# Patient Record
Sex: FEMALE | Race: WHITE | NOT HISPANIC OR LATINO | Employment: FULL TIME | URBAN - METROPOLITAN AREA
[De-identification: names, ages, dates, MRNs, and addresses within clinical notes are randomized per-mention and may not be internally consistent; named-entity substitution may affect disease eponyms.]

---

## 2017-04-17 ENCOUNTER — OFFICE VISIT (OUTPATIENT)
Dept: ORTHOPEDICS | Facility: CLINIC | Age: 67
End: 2017-04-17

## 2017-04-17 VITALS — BODY MASS INDEX: 26.37 KG/M2 | HEIGHT: 62 IN | WEIGHT: 143.3 LBS

## 2017-04-17 DIAGNOSIS — Z96.659 PAINFUL TOTAL KNEE REPLACEMENT (H): Primary | ICD-10-CM

## 2017-04-17 DIAGNOSIS — T84.84XA PAINFUL TOTAL KNEE REPLACEMENT (H): Primary | ICD-10-CM

## 2017-04-17 DIAGNOSIS — Z96.659 PAINFUL TOTAL KNEE REPLACEMENT, INITIAL ENCOUNTER (H): Primary | ICD-10-CM

## 2017-04-17 DIAGNOSIS — T84.84XA PAINFUL TOTAL KNEE REPLACEMENT, INITIAL ENCOUNTER (H): Primary | ICD-10-CM

## 2017-04-17 PROBLEM — G43.909 MIGRAINE WITHOUT STATUS MIGRAINOSUS, NOT INTRACTABLE: Status: ACTIVE | Noted: 2017-04-17

## 2017-04-17 RX ORDER — SPIRONOLACTONE 50 MG/1
50 TABLET, FILM COATED ORAL
COMMUNITY
Start: 2017-03-09

## 2017-04-17 RX ORDER — MELOXICAM 15 MG/1
15 TABLET ORAL
Status: ON HOLD | COMMUNITY
Start: 2017-04-12 | End: 2021-08-18

## 2017-04-17 RX ORDER — SILVER SULFADIAZINE 10 MG/G
CREAM TOPICAL
COMMUNITY
Start: 2017-03-09 | End: 2024-01-16

## 2017-04-17 NOTE — NURSING NOTE
"Reason For Visit:   Chief Complaint   Patient presents with     RECHECK     F/u Painful Left TKA (Nov. 2015, Dr. Garcia) with Same day XR. Referring:  DEYANIRA GARCIA                  Ht 1.581 m (5' 2.25\")  Wt 65 kg (143 lb 4.8 oz)  BMI 26 kg/m2                      Current Outpatient Prescriptions   Medication     spironolactone (ALDACTONE) 50 MG tablet     silver sulfADIAZINE (SILVADENE) 1 % cream     omeprazole (PRILOSEC) 20 MG CR capsule     meloxicam (MOBIC) 15 MG tablet     Dichloracetic Acid, [id number], (BICHLORACETIC ACID KAHLENBERG EX)     RESVERATROL PO     Biotin w/ Vitamins C & E (HAIR/SKIN/NAILS PO)     calcium carbonate (RA CALCIUM) 500 MG tablet     cyclobenzaprine (FLEXERIL) 10 MG tablet     Glucosamine-Chondroitin-MSM (RA GLUCOSAMINE-CHONDROIT-MSM) 500-400-300 MG TABS     Multiple Vitamin (MULTI-VITAMINS) TABS     DHA-EPA-VITAMIN E PO     raloxifene (EVISTA) 60 MG tablet     traMADol (ULTRAM) 50 MG tablet     traZODone (DESYREL) 50 MG tablet     triamterene-hydrochlorothiazide (DYAZIDE) 37.5-25 MG per capsule     butalbital-acetaminophen-caffeine (FIORICET, ESGIC) -40 MG per tablet     albuterol (ALBUTEROL) 108 (90 BASE) MCG/ACT inhaler     cetirizine (ZYRTEC) 10 MG tablet     Cholecalciferol (D 1000) 1000 UNITS CAPS     clindamycin-benzoyl peroxide (BENZACLIN) gel     SUMAtriptan (IMITREX) 100 MG tablet     topiramate (TOPAMAX) 100 MG tablet     metroNIDAZOLE (METROGEL) 1 % gel     No current facility-administered medications for this visit.        Allergies   Allergen Reactions     Celecoxib Other (See Comments)     Citalopram Diarrhea     Naproxen Other (See Comments)     Nsaids Other (See Comments)     Gastric ulcer     Ragweeds      Runny nose     Seasonal Allergies Other (See Comments)     Venlafaxine Other (See Comments)     Foggy headed     Vicodin [Hydrocodone-Acetaminophen] Hives     Bupropion Anxiety       Hilary Zacarias C.M.A.             "

## 2017-04-17 NOTE — LETTER
4/17/2017       RE: Sharon Torrez  [address] [facility] [place] [facility] [address]     Dear Colleague,    Thank you for referring your patient, Sharon Torrez, to the [facility] at [facility]. Please see a copy of my visit note below.    [facility], Orthopaedic Surgery Consultation  by Harrison Tyler M.D.     Sharon Torrez MRN# [id number]   Age: 66 year old YOB: 1950      Requesting physician: Scott Garcia MD [facility]  Dr. Los Rios, PCP  Dragan Thurston MD [facility]           Sharon is still having pain that is debilitating.  She tries to manage her pain daily and regrets having done the L TKA as she feels her level of function is worse than before the TKA.  The severity is greater than her last visit w/ me.  She denies any sx's of instability.   No bouts of swelling, warmth or hx of any injury.    EXAM: no effusion, warmth, 1+ LCL laxity. 0-120 deg ROM. No quad atrophy.    XR: no significant XR change. ? Halo lucency about tibial component.      Assessment and Plan:   Assessment:  1. Painful L TKA, during early postop period, etiology unclear and now worse than last visit. Diff Dx: premature loosening, occult infection, component malposition with PF joint dysfunction. No sx's of instability or mechanical dysfunction.           Plan:  1. We will repeat the w/u, that was done previously for an infection, due to ongoing symptoms  2. Aspiration for cell count, cultures, alpha defensin.  3. ESR,CRP, IL6  4. Repeat Bone scan.  To be done at [facility] for consistency. Although premature loosening is a possibility, findings on current imaging are rather subtle (only suggestion of such on BS while XR unremarkable).   5. She wants to wait untl 5/1 to undergo the above investigations so they are included in her Medicare coverage.  6. If above w/u is (-), then advise serial observation with imaging to assess for any change over time.       Harrison REGAN  MD Merlene Tyler Family Professor  Oncology and Adult Reconstructive Surgery  Dept Orthopaedic Surgery, LT[facility], located within [facility]  [phone] office, [phone] pager  [email or web address]    Total Time = 15 min, 50% of which was spent in counseling and coordination of care as documented above.

## 2017-04-17 NOTE — PROGRESS NOTES
[facility], Orthopaedic Surgery Consultation  by Harrison Tyler M.D.     Sharon Torrez MRN# [id number]   Age: 66 year old YOB: 1950      Requesting physician: Scott Garcia MD [facility]  Dr. Los Rios, PCP  Dragan Thurston MD [facility]           Sharon is still having pain that is debilitating.  She tries to manage her pain daily and regrets having done the L TKA as she feels her level of function is worse than before the TKA.  The severity is greater than her last visit w/ me.  She denies any sx's of instability.   No bouts of swelling, warmth or hx of any injury.    EXAM: no effusion, warmth, 1+ LCL laxity. 0-120 deg ROM. No quad atrophy.    XR: no significant XR change. ? Halo lucency about tibial component.      Assessment and Plan:   Assessment:  1. Painful L TKA, during early postop period, etiology unclear and now worse than last visit. Diff Dx: premature loosening, occult infection, component malposition with PF joint dysfunction. No sx's of instability or mechanical dysfunction.           Plan:  1. We will repeat the w/u, that was done previously for an infection, due to ongoing symptoms  2. Aspiration for cell count, cultures, alpha defensin.  3. ESR,CRP, IL6  4. Repeat Bone scan.  To be done at [facility] for consistency. Although premature loosening is a possibility, findings on current imaging are rather subtle (only suggestion of such on BS while XR unremarkable).   5. She wants to wait untl 5/1 to undergo the above investigations so they are included in her Medicare coverage.  6. If above w/u is (-), then advise serial observation with imaging to assess for any change over time.       MD Merlene Barros Family Professor  Oncology and Adult Reconstructive Surgery  Dept Orthopaedic Surgery, [facility]  [phone] office, [phone] pager  [email or web address]    Total Time = 15 min, 50% of which was spent in counseling and coordination of care as documented  above.

## 2017-04-17 NOTE — MR AVS SNAPSHOT
After Visit Summary   4/17/2017    Sharon Torrez    MRN: [id number]           Patient Information     Date Of Birth          1950        Visit Information        Provider Department      4/17/2017 2:15 PM Harrison Tyler MD [facility]        Today's Diagnoses     Painful total knee replacement, initial encounter (H)    -  1       Follow-ups after your visit        Your next 10 appointments already scheduled     Apr 17, 2017  4:00 PM CDT   LAB with  LAB   [facility] ([facility])    [address] [address] [phone]           Patient must bring picture ID.  Patient should be prepared to give a urine specimen  Please do not eat 10-12 hours before your appointment if you are coming in fasting for labs on lipids, cholesterol, or glucose (sugar).  Pregnant women should follow their Care Team instructions. Water with medications is okay. Do not drink coffee or other fluids.   If you have concerns about taking  your medications, please ask at office or if scheduling via [employer], send a message by clicking on Secure Messaging, Message Your Care Team.            May 04, 2017  8:00 AM CDT   NM INJECT 3PH BONE SCAN with SHNM1   [facility] ([facility])    [address] [place] [id number] [phone]            May 04, 2017 11:00 AM CDT   NM BONE SCAN 3 PHASE with SHNM1   [facility] ([facility])    [address] [place] [id number] [phone]           Please bring a list of your medicines to the exam. (Include vitamins, minerals and over-the-counter drugs.) You should wear comfortable clothes. Leave your valuables at home. Please bring related prior results and films. Tell your doctor:   If you are breastfeeding or may be pregnant.   If you have had a barium test within the past few days. Barium may change the  results of certain exams.   If you think you may need sedation (medicine to help you relax).  You may eat and drink as normal.  Drink plenty of fluids and empty bladder frequently between injection and scans.            May 04, 2017  1:00 PM CDT   XR JOINT ASPRIATION MAJOR LT with SHXR4, SH MSK RAD   [facility] ([facility])    [address] [facility] [address] [address] [phone]           Stop drinking 1 hour before the exam.  You may take your medicines as usual, except for blood thinners (Coumadin, Plavix, Ticlid, Persantine, Aggrenox, Pletal, Effient, Brilliant). Talk to your doctor if you take these.  Tell your doctor if:   You have ever had an allergic reaction to X-ray dye (contrast fluid).   There is a chance you may be pregnant.  Please bring a list of your current medicines to your exam. Include vitamins, minerals and over-the-counter medicines.  Please call the Imaging Department at your exam site with any questions.              Future tests that were ordered for you today     Open Future Orders        Priority Expected Expires Ordered    CRP inflammation Routine  5/17/2017 4/17/2017    Erythrocyte sedimentation rate auto Routine  5/17/2017 4/17/2017    Interleukin 6 Blood Routine  5/17/2017 4/17/2017    NM Bone scan 3 phase Routine  4/17/2018 4/17/2017    XR Joint Aspiration Major Left Routine 4/17/2017 4/17/2018 4/17/2017            Who to contact     Please call your clinic at [phone] to:    Ask questions about your health    Make or cancel appointments    Discuss your medicines    Learn about your test results    Speak to your doctor   If you have compliments or concerns about an experience at your clinic, or if you wish to file a complaint, please contact [facility] Patient Relations at [phone] or email us at [email or web address]         Additional Information About Your Visit        MyChart Information      ""[employer] is an electronic gateway that provides easy, online access to your medical records. With "[employer] you can request a clinic appointment, read your test results, renew a prescription or communicate with your care team.     To sign up for "[employer] visit the website at [email or web address]   You will be asked to enter the access code listed below, as well as some personal information. Please follow the directions to create your username and password.     Your access code is: [email or web address]  Expires: 2017  3:39 PM     Your access code will  in 90 days. If you need help or a new code, please contact your [facility] or call [phone] for assistance.        Care EveryWhere ID     This is your Care EveryWhere ID. This could be used by other organizations to access your [facility] medical records  [id number]        Your Vitals Were     Height BMI (Body Mass Index)                1.581 m (5' 2.25\") 26 kg/m2           Blood Pressure from Last 3 Encounters:   13 131/72    Weight from Last 3 Encounters:   17 65 kg (143 lb 4.8 oz)   16 68.5 kg (151 lb)   13 69.4 kg (153 lb)               Primary Care Provider Office Phone # Fax #    Los Rios [phone]       [facility] [address] [place] [id number]        Thank you!     Thank you for choosing [facility] ORTHOPAEDIC [facility]  for your care. Our goal is always to provide you with excellent care. Hearing back from our patients is one way we can continue to improve our services. Please take a few minutes to complete the written survey that you may receive in the mail after your visit with us. Thank you!             Your Updated Medication List - Protect others around you: Learn how to safely use, store and throw away your medicines at www.disposemymeds.org.          This list is accurate as of: 17  3:39 PM.  Always use your most recent med list.                "    Brand Name Dispense Instructions for use    albuterol 108 (90 BASE) MCG/ACT Inhaler   Generic drug:  albuterol      Inhale 2 puffs into the lungs       BICHLORACETIC ACID ALY EX      bichlor daily       butalbital-acetaminophen-caffeine -40 MG per tablet    FIORICET/ESGIC         cetirizine 10 MG tablet    zyrTEC     Take 10 mg by mouth       clindamycin-benzoyl peroxide gel    BENZACLIN     Apply to skin daily hs       cyclobenzaprine 10 MG tablet    FLEXERIL         D 1000 1000 UNITS Caps      Take 3 capsules by mouth       DHA-EPA-VITAMIN E PO          EVISTA 60 MG tablet   Generic drug:  raloxifene      Take 60 mg by mouth       HAIR/SKIN/NAILS PO          meloxicam 15 MG tablet    MOBIC     Take 15 mg by mouth       metroNIDAZOLE 1 % gel    METROGEL     Apply topically daily       MULTI-VITAMINS Tabs          omeprazole 20 MG CR capsule    priLOSEC     Take 20 mg by mouth       RA CALCIUM 500 MG tablet   Generic drug:  calcium carbonate          RA GLUCOSAMINE-CHONDROIT--400-300 MG Tabs   Generic drug:  Glucosamine-Chondroitin-MSM          RESVERATROL PO      Resveratrol liquid daily       silver sulfADIAZINE 1 % cream    SILVADENE         spironolactone 50 MG tablet    ALDACTONE     Take 50 mg by mouth       SUMAtriptan 100 MG tablet    IMITREX     TK 1 T PO AT ONSET OF HEADACHE UTD       TOPAMAX 100 MG tablet   Generic drug:  topiramate          traMADol 50 MG tablet    ULTRAM     Take 50 mg by mouth       traZODone 50 MG tablet    DESYREL     Take 100 mg by mouth       triamterene-hydrochlorothiazide 37.5-25 MG per capsule    DYAZIDE     Take 1 capsule by mouth

## 2017-05-04 ENCOUNTER — HOSPITAL ENCOUNTER (OUTPATIENT)
Dept: NUCLEAR MEDICINE | Facility: CLINIC | Age: 67
Setting detail: NUCLEAR MEDICINE
Discharge: HOME | End: 2017-05-04
Attending: ORTHOPAEDIC SURGERY | Admitting: ORTHOPAEDIC SURGERY
Payer: MEDICARE

## 2017-05-04 ENCOUNTER — HOSPITAL ENCOUNTER (OUTPATIENT)
Dept: GENERAL RADIOLOGY | Facility: CLINIC | Age: 67
Discharge: HOME | End: 2017-05-04
Attending: ORTHOPAEDIC SURGERY | Admitting: ORTHOPAEDIC SURGERY
Payer: MEDICARE

## 2017-05-04 ENCOUNTER — HOSPITAL ENCOUNTER (OUTPATIENT)
Dept: NUCLEAR MEDICINE | Facility: CLINIC | Age: 67
Setting detail: NUCLEAR MEDICINE
End: 2017-05-04
Attending: ORTHOPAEDIC SURGERY
Payer: MEDICARE

## 2017-05-04 VITALS — DIASTOLIC BLOOD PRESSURE: 91 MMHG | SYSTOLIC BLOOD PRESSURE: 138 MMHG | OXYGEN SATURATION: 99 % | HEART RATE: 80 BPM

## 2017-05-04 DIAGNOSIS — T84.84XA PAINFUL TOTAL KNEE REPLACEMENT, INITIAL ENCOUNTER (H): ICD-10-CM

## 2017-05-04 DIAGNOSIS — Z96.659 PAINFUL TOTAL KNEE REPLACEMENT, INITIAL ENCOUNTER (H): ICD-10-CM

## 2017-05-04 LAB
APPEARANCE FLD: NORMAL
COLOR FLD: NORMAL
LYMPHOCYTES NFR FLD MANUAL: 41 %
MONOS+MACROS NFR FLD MANUAL: 25 %
OTHER CELLS FLD MANUAL: 34 %
SPECIMEN SOURCE FLD: NORMAL
WBC # FLD AUTO: NORMAL /UL

## 2017-05-04 PROCEDURE — 34300033 ZZH RX 343: Performed by: ORTHOPAEDIC SURGERY

## 2017-05-04 PROCEDURE — 78315 BONE IMAGING 3 PHASE: CPT

## 2017-05-04 PROCEDURE — A9503 TC99M MEDRONATE: HCPCS | Performed by: ORTHOPAEDIC SURGERY

## 2017-05-04 RX ORDER — LIDOCAINE HYDROCHLORIDE 10 MG/ML
30 INJECTION, SOLUTION EPIDURAL; INFILTRATION; INTRACAUDAL; PERINEURAL ONCE
Status: COMPLETED | OUTPATIENT
Start: 2017-05-04 | End: 2017-05-04

## 2017-05-04 RX ORDER — TC 99M MEDRONATE 20 MG/10ML
25 INJECTION, POWDER, LYOPHILIZED, FOR SOLUTION INTRAVENOUS ONCE
Status: COMPLETED | OUTPATIENT
Start: 2017-05-04 | End: 2017-05-04

## 2017-05-04 RX ADMIN — LIDOCAINE HYDROCHLORIDE 3 ML: 10 INJECTION, SOLUTION EPIDURAL; INFILTRATION; INTRACAUDAL; PERINEURAL at 13:34

## 2017-05-04 RX ADMIN — TC 99M MEDRONATE 25.7 MCI.: 20 INJECTION, POWDER, LYOPHILIZED, FOR SOLUTION INTRAVENOUS at 08:30

## 2017-05-04 NOTE — PROGRESS NOTES
The results were reviewed.  Please notify the patient of any abnormal results.      Labs do not suggest an infection.  Alpha defensin pending.  Aseptic occult loosening is possible.  Please obtain a CT scan for comparison to last CT.    Harrison Tyler MD  5/4/2017  6:05 PM

## 2017-05-04 NOTE — IP AVS SNAPSHOT
MRN:[id number]                      After Visit Summary   5/4/2017    Sharon Torrez    MRN: [id number]           Visit Information        Provider Department      5/4/2017  1:00 PM [facility] RAD; [facility] Radiology           Review of your medicines      UNREVIEWED medicines. Ask your doctor about these medicines        Dose / Directions    albuterol 108 (90 BASE) MCG/ACT Inhaler   Generic drug:  albuterol        Dose:  2 puff   Inhale 2 puffs into the lungs   Refills:  0       BICHLORACETIC ACID ALY ZACARIAS   Used for:  Painful total knee replacement, initial encounter (H)        bichlor daily   Refills:  0       butalbital-acetaminophen-caffeine -40 MG per tablet   Commonly known as:  FIORICET/ESGIC        Refills:  0       cetirizine 10 MG tablet   Commonly known as:  zyrTEC        Dose:  10 mg   Take 10 mg by mouth   Refills:  0       clindamycin-benzoyl peroxide gel   Commonly known as:  BENZACLIN        Apply to skin daily hs   Refills:  0       cyclobenzaprine 10 MG tablet   Commonly known as:  FLEXERIL        Refills:  0       D 1000 1000 UNITS Caps        Dose:  3 capsule   Take 3 capsules by mouth   Refills:  0       DHA-EPA-VITAMIN E PO        Refills:  0       EVISTA 60 MG tablet   Generic drug:  raloxifene        Dose:  60 mg   Take 60 mg by mouth   Refills:  0       HAIR/SKIN/NAILS PO   Used for:  Painful total knee replacement, initial encounter (H)        Refills:  0       meloxicam 15 MG tablet   Commonly known as:  MOBIC   Used for:  Painful total knee replacement, initial encounter (H)        Dose:  15 mg   Take 15 mg by mouth   Refills:  0       metroNIDAZOLE 1 % gel   Commonly known as:  METROGEL        Apply topically daily   Refills:  0       MULTI-VITAMINS Tabs        Refills:  0       omeprazole 20 MG CR capsule   Commonly known as:  priLOSEC   Used for:  Painful total knee replacement, initial encounter (H)        Dose:  20 mg   Take 20 mg by mouth    Refills:  0       RA CALCIUM 500 MG tablet   Generic drug:  calcium carbonate        Refills:  0       RA GLUCOSAMINE-CHONDROIT--400-300 MG Tabs   Generic drug:  Glucosamine-Chondroitin-MSM        Refills:  0       RESVERATROL PO   Used for:  Painful total knee replacement, initial encounter (H)        Resveratrol liquid daily   Refills:  0       silver sulfADIAZINE 1 % cream   Commonly known as:  SILVADENE   Used for:  Painful total knee replacement, initial encounter (H)        Refills:  0       spironolactone 50 MG tablet   Commonly known as:  ALDACTONE   Used for:  Painful total knee replacement, initial encounter (H)        Dose:  50 mg   Take 50 mg by mouth   Refills:  0       SUMAtriptan 100 MG tablet   Commonly known as:  IMITREX        TK 1 T PO AT ONSET OF HEADACHE UTD   Refills:  0       TOPAMAX 100 MG tablet   Generic drug:  topiramate        Refills:  0       traMADol 50 MG tablet   Commonly known as:  ULTRAM        Dose:  50 mg   Take 50 mg by mouth   Refills:  0       traZODone 50 MG tablet   Commonly known as:  DESYREL        Dose:  100 mg   Take 100 mg by mouth   Refills:  0       triamterene-hydrochlorothiazide 37.5-25 MG per capsule   Commonly known as:  DYAZIDE        Dose:  1 capsule   Take 1 capsule by mouth   Refills:  0                Protect others around you: Learn how to safely use, store and throw away your medicines at www.disposemymeds.org.         Follow-ups after your visit         Care Instructions        Further instructions from your care team         Orthopedic Discharge Instructions:   After Your Injection or Aspiration  _______________________________________    Patient Name:  Sharon CONNOR Lore  Today's Date:  May 4, 2017  The doctor who performed your Joint Aspiration was  Pedro Burrows PA-C at [facility]) in the  General Radiology Department  Care of needle site    If you have new numbness down your leg, this may last up to 6 hours, but it  should go away. You may need help with walking until your leg feels normal.     Over the next 24 to 48 hours, pain at the needle site may increase before it gets better.     For the next 48 hours, use ice packs for 15 minutes, three to four times a day for pain.    If you have a bandage, you may remove it the next morning.     No tub baths, hot tubs or swimming for 48 hours. You may shower the next day.   Activity    Do not drive until morning.     Limit your activity based on your pain level. Follow your doctor s orders for activity.     You may eat a normal diet.     If you had sedation,   - You may feel sleepy, forgetful or unsteady.   - Do not drink alcohol for 24 hours.  Medicines    If you take aspirin or platelet inhibitors, you can restart them tomorrow.     Restart all other medicines today at your regular dose, including Coumadin (warfarin).    If you are restarting Coumadin, talk to your doctor about having your INR checked.   If you had a steroid shot     The medicine should help reduce swelling and pain. This may take from 7 to 10 days.     Side effects from the shot will be mild and go away in 2 to 3 days. Common side effects may include:  -  Insomnia (trouble sleeping).  -  Heartburn.  -  Flushed face.  -  Water retention (bloating or fluid build-up).  -  Increased appetite (feeling more hungry than usual).  -  Increased blood sugar.  If you have diabetes, watch your blood sugar closely. If needed, call your doctor to help you control your blood sugar.  Some patients will get lasting relief from a single shot. Others may require up to three shots to get results. If you have more than one steroid shot, they should be given two weeks apart.  Some patients do not have relief of symptoms.    Follow-up:  No follow-up needed     Call your doctor  or go to the Emergency Room if you have severe pain, fever or problems with bowel or bladder control.      Additional Information About Your Visit        [facility]  "Information     Go Try It On lets you send messages to your doctor, view your test results, renew your prescriptions, schedule appointments and more. To sign up, go to [email or web address] . Click on \"Log in\" on the left side of the screen, which will take you to the Welcome page. Then click on \"Sign up Now\" on the right side of the page.     You will be asked to enter the access code listed below, as well as some personal information. Please follow the directions to create your username and password.     Your access code is: [email or web address]  Expires: 2017  3:39 PM     Your access code will  in 90 days. If you need help or a new code, please call your [facility] clinic or [phone].        Care EveryWhere ID     This is your Care EveryWhere ID. This could be used by other organizations to access your [facility] medical records  [id number]         Primary Care Provider Office Phone # Fax #    [place] [phone]      Thank you!     Thank you for choosing [facility] for your care. Our goal is always to provide you with excellent care. Hearing back from our patients is one way we can continue to improve our services. Please take a few minutes to complete the written survey that you may receive in the mail after you visit with us. Thank you!             Medication List: This is a list of all your medications and when to take them. Check marks below indicate your daily home schedule. Keep this list as a reference.      Medications           Morning Afternoon Evening Bedtime As Needed    albuterol 108 (90 BASE) MCG/ACT Inhaler   Inhale 2 puffs into the lungs   Generic drug:  albuterol                                BICHLORACETIC ACID ALY EX   bichlor daily                                butalbital-acetaminophen-caffeine -40 MG per tablet   Commonly known as:  FIORICET/ESGIC                                cetirizine 10 MG tablet   Commonly known as:  zyrTEC   Take 10 mg by " mouth                                clindamycin-benzoyl peroxide gel   Commonly known as:  BENZACLIN   Apply to skin daily hs                                cyclobenzaprine 10 MG tablet   Commonly known as:  FLEXERIL                                D 1000 1000 UNITS Caps   Take 3 capsules by mouth                                DHA-EPA-VITAMIN E PO                                EVISTA 60 MG tablet   Take 60 mg by mouth   Generic drug:  raloxifene                                HAIR/SKIN/NAILS PO                                meloxicam 15 MG tablet   Commonly known as:  MOBIC   Take 15 mg by mouth                                metroNIDAZOLE 1 % gel   Commonly known as:  METROGEL   Apply topically daily                                MULTI-VITAMINS Tabs                                omeprazole 20 MG CR capsule   Commonly known as:  priLOSEC   Take 20 mg by mouth                                RA CALCIUM 500 MG tablet   Generic drug:  calcium carbonate                                RA GLUCOSAMINE-CHONDROIT--400-300 MG Tabs   Generic drug:  Glucosamine-Chondroitin-MSM                                RESVERATROL PO   Resveratrol liquid daily                                silver sulfADIAZINE 1 % cream   Commonly known as:  SILVADENE                                spironolactone 50 MG tablet   Commonly known as:  ALDACTONE   Take 50 mg by mouth                                SUMAtriptan 100 MG tablet   Commonly known as:  IMITREX   TK 1 T PO AT ONSET OF HEADACHE UTD                                TOPAMAX 100 MG tablet   Generic drug:  topiramate                                traMADol 50 MG tablet   Commonly known as:  ULTRAM   Take 50 mg by mouth                                traZODone 50 MG tablet   Commonly known as:  DESYREL   Take 100 mg by mouth                                triamterene-hydrochlorothiazide 37.5-25 MG per capsule   Commonly known as:  DYAZIDE   Take 1 capsule by mouth

## 2017-05-04 NOTE — IP AVS SNAPSHOT
[facility] Radiology    [address] [address]    Phone:  [phone]                                       After Visit Summary   5/4/2017    Sharon Torrez    MRN: [id number]           After Visit Summary Signature Page     I have received my discharge instructions, and my questions have been answered. I have discussed any challenges I see with this plan with the nurse or doctor.    ..........................................................................................................................................  Patient/Patient Representative Signature      ..........................................................................................................................................  Patient Representative Print Name and Relationship to Patient    ..................................................               ................................................  Date                                            Time    ..........................................................................................................................................  Reviewed by Signature/Title    ...................................................              ..............................................  Date                                                            Time

## 2017-05-04 NOTE — PROGRESS NOTES
RADIOLOGY PROCEDURE NOTE  Patient name: Sharon Torrez  MRN: [id number]  : 1950    Pre-procedure diagnosis: Left knee pain  Post-procedure diagnosis: Same    Procedure Date/Time: May 4, 2017  1:39 PM  Procedure: Left knee aspiration  Estimated blood loss: None  Specimen(s) collected with description: 15 cc thick synovial fluid  The patient tolerated the procedure well with no immediate complications.  Significant findings:none    See imaging dictation for procedural details.    Provider name: Pedro Burrows  Assistant(s):None

## 2017-05-04 NOTE — DISCHARGE INSTRUCTIONS
Orthopedic Discharge Instructions:   After Your Injection or Aspiration  _______________________________________    Patient Name:  Sharon Torrez  Today's Date:  May 4, 2017  The doctor who performed your Joint Aspiration was  Pedro Burrows PA-C at [facility]) in the  General Radiology Department  Care of needle site    If you have new numbness down your leg, this may last up to 6 hours, but it should go away. You may need help with walking until your leg feels normal.     Over the next 24 to 48 hours, pain at the needle site may increase before it gets better.     For the next 48 hours, use ice packs for 15 minutes, three to four times a day for pain.    If you have a bandage, you may remove it the next morning.     No tub baths, hot tubs or swimming for 48 hours. You may shower the next day.   Activity    Do not drive until morning.     Limit your activity based on your pain level. Follow your doctor s orders for activity.     You may eat a normal diet.     If you had sedation,   - You may feel sleepy, forgetful or unsteady.   - Do not drink alcohol for 24 hours.  Medicines    If you take aspirin or platelet inhibitors, you can restart them tomorrow.     Restart all other medicines today at your regular dose, including Coumadin (warfarin).    If you are restarting Coumadin, talk to your doctor about having your INR checked.   If you had a steroid shot     The medicine should help reduce swelling and pain. This may take from 7 to 10 days.     Side effects from the shot will be mild and go away in 2 to 3 days. Common side effects may include:  -  Insomnia (trouble sleeping).  -  Heartburn.  -  Flushed face.  -  Water retention (bloating or fluid build-up).  -  Increased appetite (feeling more hungry than usual).  -  Increased blood sugar.  If you have diabetes, watch your blood sugar closely. If needed, call your doctor to help you control your blood sugar.  Some patients will get  lasting relief from a single shot. Others may require up to three shots to get results. If you have more than one steroid shot, they should be given two weeks apart.  Some patients do not have relief of symptoms.    Follow-up:  No follow-up needed     Call your doctor  or go to the Emergency Room if you have severe pain, fever or problems with bowel or bladder control.

## 2017-05-08 DIAGNOSIS — Z96.659 PAIN DUE TO KNEE JOINT PROSTHESIS, SUBSEQUENT ENCOUNTER: Primary | ICD-10-CM

## 2017-05-08 DIAGNOSIS — T84.84XD PAIN DUE TO KNEE JOINT PROSTHESIS, SUBSEQUENT ENCOUNTER: Primary | ICD-10-CM

## 2017-05-09 LAB
BACTERIA SPEC CULT: NO GROWTH
MICRO REPORT STATUS: NORMAL
SPECIMEN SOURCE: NORMAL

## 2017-05-11 ENCOUNTER — HOSPITAL ENCOUNTER (OUTPATIENT)
Dept: CT IMAGING | Facility: CLINIC | Age: 67
Discharge: HOME | End: 2017-05-11
Attending: ORTHOPAEDIC SURGERY | Admitting: ORTHOPAEDIC SURGERY
Payer: MEDICARE

## 2017-05-11 ENCOUNTER — OFFICE VISIT (OUTPATIENT)
Dept: ORTHOPEDICS | Facility: CLINIC | Age: 67
End: 2017-05-11

## 2017-05-11 VITALS — HEIGHT: 63 IN | WEIGHT: 144.2 LBS | BODY MASS INDEX: 25.55 KG/M2

## 2017-05-11 DIAGNOSIS — T84.84XD PAIN DUE TO KNEE JOINT PROSTHESIS, SUBSEQUENT ENCOUNTER: ICD-10-CM

## 2017-05-11 DIAGNOSIS — T84.84XD PAINFUL TOTAL KNEE REPLACEMENT, SUBSEQUENT ENCOUNTER: Primary | ICD-10-CM

## 2017-05-11 DIAGNOSIS — Z96.659 PAIN DUE TO KNEE JOINT PROSTHESIS, SUBSEQUENT ENCOUNTER: ICD-10-CM

## 2017-05-11 DIAGNOSIS — Z96.659 PAINFUL TOTAL KNEE REPLACEMENT, SUBSEQUENT ENCOUNTER: Primary | ICD-10-CM

## 2017-05-11 PROCEDURE — 73700 CT LOWER EXTREMITY W/O DYE: CPT | Mod: LT

## 2017-05-11 RX ORDER — HYDROXYZINE HYDROCHLORIDE 25 MG/1
25 TABLET, FILM COATED ORAL
COMMUNITY
Start: 2017-04-27

## 2017-05-11 RX ORDER — OXYCODONE HYDROCHLORIDE 10 MG/1
TABLET ORAL
Status: ON HOLD | COMMUNITY
Start: 2017-04-30 | End: 2018-05-09

## 2017-05-11 RX ORDER — BIOTIN 10 MG
10 TABLET ORAL
Status: ON HOLD | COMMUNITY
Start: 2013-11-07 | End: 2021-08-18

## 2017-05-11 RX ORDER — CLONAZEPAM 0.5 MG/1
0.5 TABLET ORAL
COMMUNITY
Start: 2017-03-09

## 2017-05-11 NOTE — NURSING NOTE
"Reason For Visit:   Chief Complaint   Patient presents with     Results     F/U Same Day CT Scan for Painful Left TKA                 Ht 1.588 m (5' 2.5\")  Wt 65.4 kg (144 lb 3.2 oz)  BMI 25.95 kg/m2             Current Outpatient Prescriptions   Medication     oxyCODONE (ROXICODONE) 10 MG IR tablet     hydrOXYzine (ATARAX) 25 MG tablet     clonazePAM (KLONOPIN) 0.5 MG tablet     Biotin 10 MG TABS tablet     spironolactone (ALDACTONE) 50 MG tablet     silver sulfADIAZINE (SILVADENE) 1 % cream     omeprazole (PRILOSEC) 20 MG CR capsule     meloxicam (MOBIC) 15 MG tablet     Dichloracetic Acid, [id number], (BICHLORACETIC ACID ALY EX)     RESVERATROL PO     Biotin w/ Vitamins C & E (HAIR/SKIN/NAILS PO)     calcium carbonate (RA CALCIUM) 500 MG tablet     cyclobenzaprine (FLEXERIL) 10 MG tablet     Glucosamine-Chondroitin-MSM (RA GLUCOSAMINE-CHONDROIT-MSM) 500-400-300 MG TABS     Multiple Vitamin (MULTI-VITAMINS) TABS     DHA-EPA-VITAMIN E PO     raloxifene (EVISTA) 60 MG tablet     traMADol (ULTRAM) 50 MG tablet     traZODone (DESYREL) 50 MG tablet     triamterene-hydrochlorothiazide (DYAZIDE) 37.5-25 MG per capsule     butalbital-acetaminophen-caffeine (FIORICET, ESGIC) -40 MG per tablet     albuterol (ALBUTEROL) 108 (90 BASE) MCG/ACT inhaler     cetirizine (ZYRTEC) 10 MG tablet     Cholecalciferol (D 1000) 1000 UNITS CAPS     clindamycin-benzoyl peroxide (BENZACLIN) gel     SUMAtriptan (IMITREX) 100 MG tablet     topiramate (TOPAMAX) 100 MG tablet     metroNIDAZOLE (METROGEL) 1 % gel     No current facility-administered medications for this visit.        Allergies   Allergen Reactions     Celecoxib Other (See Comments)     Citalopram Diarrhea     Naproxen Other (See Comments)     Nsaids Other (See Comments)     Gastric ulcer     Ragweeds      Runny nose     Seasonal Allergies Other (See Comments)     Venlafaxine Other (See Comments)     Foggy headed     Vicodin [Hydrocodone-Acetaminophen] Hives     " Bupropion Anxiety       Hilary Zacarias C.M.A.

## 2017-05-11 NOTE — MR AVS SNAPSHOT
After Visit Summary   2017    Sharon Torrez    MRN: [id number]           Patient Information     Date Of Birth          1950        Visit Information        Provider Department      2017 11:30 AM Harrison Tyler MD [facility]        Today's Diagnoses     Painful total knee replacement, subsequent encounter    -  1       Follow-ups after your visit        Future tests that were ordered for you today     Open Future Orders        Priority Expected Expires Ordered    CT Knee Left w/o Contrast Routine  2018            Who to contact     Please call your clinic at [phone] to:    Ask questions about your health    Make or cancel appointments    Discuss your medicines    Learn about your test results    Speak to your doctor   If you have compliments or concerns about an experience at your clinic, or if you wish to file a complaint, please contact [facility] Patient Relations at [phone] or email us at [email or web address]         Additional Information About Your Visit        MyChart Information     [employer] is an electronic gateway that provides easy, online access to your medical records. With [employer], you can request a clinic appointment, read your test results, renew a prescription or communicate with your care team.     To sign up for [employer] visit the website at [email or web address]   You will be asked to enter the access code listed below, as well as some personal information. Please follow the directions to create your username and password.     Your access code is: [email or web address]  Expires: 2017  3:39 PM     Your access code will  in 90 days. If you need help or a new code, please contact your [facility] or call [phone] for assistance.        Care EveryWhere ID     This is your Care EveryWhere ID. This could be used by other organizations to access your  "[facility] medical records  [id number]        Your Vitals Were     Height BMI (Body Mass Index)                1.588 m (5' 2.5\") 25.95 kg/m2           Blood Pressure from Last 3 Encounters:   05/04/17 (!) 138/91   01/04/13 131/72    Weight from Last 3 Encounters:   05/11/17 65.4 kg (144 lb 3.2 oz)   04/17/17 65 kg (143 lb 4.8 oz)   08/25/16 68.5 kg (151 lb)              Today, you had the following     No orders found for display       Primary Care Provider Office Phone # Fax #    [place] [phone]       [place] [address] [address] [place] [address]        Thank you!     Thank you for choosing [facility]  for your care. Our goal is always to provide you with excellent care. Hearing back from our patients is one way we can continue to improve our services. Please take a few minutes to complete the written survey that you may receive in the mail after your visit with us. Thank you!             Your Updated Medication List - Protect others around you: Learn how to safely use, store and throw away your medicines at www.disposemymeds.org.          This list is accurate as of: 5/11/17  4:29 PM.  Always use your most recent med list.                   Brand Name Dispense Instructions for use    albuterol 108 (90 BASE) MCG/ACT Inhaler   Generic drug:  albuterol      Inhale 2 puffs into the lungs       BICHLORACETIC ACID ALY EX      shefali daily       Biotin 10 MG Tabs tablet      Take 10 mg by mouth       butalbital-acetaminophen-caffeine -40 MG per tablet    FIORICET/ESGIC         cetirizine 10 MG tablet    zyrTEC     Take 10 mg by mouth       clindamycin-benzoyl peroxide gel    BENZACLIN     Apply to skin daily hs       clonazePAM 0.5 MG tablet    klonoPIN     Take 0.5 mg by mouth       cyclobenzaprine 10 MG tablet    FLEXERIL         D 1000 1000 UNITS Caps      Take 3 capsules by mouth       DHA-EPA-VITAMIN E PO          EVISTA 60 MG tablet "   Generic drug:  raloxifene      Take 60 mg by mouth       HAIR/SKIN/NAILS PO          hydrOXYzine 25 MG tablet    ATARAX     Take 25 mg by mouth       meloxicam 15 MG tablet    MOBIC     Take 15 mg by mouth       metroNIDAZOLE 1 % gel    METROGEL     Apply topically daily       MULTI-VITAMINS Tabs          omeprazole 20 MG CR capsule    priLOSEC     Take 20 mg by mouth       oxyCODONE 10 MG IR tablet    ROXICODONE     TK ONE T PO BID FOR A MONTH FOR POST OP PAIN       RA CALCIUM 500 MG tablet   Generic drug:  calcium carbonate          RA GLUCOSAMINE-CHONDROIT--400-300 MG Tabs   Generic drug:  Glucosamine-Chondroitin-MSM          RESVERATROL PO      Resveratrol liquid daily       silver sulfADIAZINE 1 % cream    SILVADENE         spironolactone 50 MG tablet    ALDACTONE     Take 50 mg by mouth       SUMAtriptan 100 MG tablet    IMITREX     TK 1 T PO AT ONSET OF HEADACHE UTD       TOPAMAX 100 MG tablet   Generic drug:  topiramate          traMADol 50 MG tablet    ULTRAM     Take 50 mg by mouth       traZODone 50 MG tablet    DESYREL     Take 100 mg by mouth       triamterene-hydrochlorothiazide 37.5-25 MG per capsule    DYAZIDE     Take 1 capsule by mouth

## 2017-05-11 NOTE — PROGRESS NOTES
These test and/or imaging results were discussed with the patient by either myself or Ifrah Kim RN.      Harrison Tyler MD  5/11/2017  5:08 PM

## 2017-05-11 NOTE — PROGRESS NOTES
These test and/or imaging results were discussed with the patient by either myself or Ifrah Kim RN.      Harrison Tyler MD  5/11/2017  6:05 PM

## 2017-05-11 NOTE — LETTER
5/11/2017       RE: Sharon Torrez  [address] [place] [address]     Dear Colleague,    Thank you for referring your patient, Sharon Torrez, to the [facility] at [facility]. Please see a copy of my visit note below.    [facility], Orthopaedic Surgery Consultation  by Harrison Tyler M.D.     Sharon Torrez MRN# [id number]   Age: 66 year old YOB: 1950      Requesting physician: Scott Garcia MD [facility]  Dr. Los Rios, PCP  Dragan Thurston MD [facility]           Sharon is still having pain that is debilitating and as noted at last visit, has been progressive.  She tries to manage her pain daily and regrets having done the L TKA as she feels her level of function is worse than before the TKA.  The severity is greater than her last visit w/ me.  Upon additional questioning, she does have sx's of instability but pain is her main complaint.  She does use a short hinge knee brace and has resisted using the larger full length hinge brace given it's inconvenience.  No bouts of swelling, warmth or hx of any injury.    EXAM: no effusion, warmth, 2+ LCL laxity and 2+ MCL laxity. 0-120 deg ROM. No quad atrophy.    XR: no significant XR change. ? Halo lucency about tibial component.    Recent Labs   Lab Test  08/25/16   1342   SED  7   CRP  <2.9   synovasure alpha defensin NEGATIVE     Recent Labs   Lab Test  05/04/17   1335  08/25/16   1300   FTYP  Left Knee  Synovial fluid   Left Knee     FNEU   --   24   FOTH  34   --    FCOL  Orange  Red   FAPR  Cloudy  Cloudy   FWBC  1722  No reference ranges have been established.  This result should be interpreted in   the context of the patient's clinical condition and compared to simultaneous   measurement in the patient's blood.  Refer to Lab Guide for specific   interpretive guidelines.    1344     Recent Labs   Lab Test  05/04/17   1335  08/25/16   1300   SDES  Left Knee Aspirate  Left Knee  Aspirate  Left Knee Fluid  Left Knee Fluid   CULT  No growth  Culture negative monitoring continues  No anaerobes isolated  No growth   RPT  FINAL 05/09/2017  Pending  FINAL 09/08/2016  FINAL 08/30/2016         Assessment and Plan:   Assessment:  Painful L TKA, during early postop period, etiology unclear and now worse than last visit. She also has some evidence of excessive laxity on examination.  There is no evidence of an infection.    Plan:  We had extensive discussion re risks pros/cons of exploration and possible tibial component revision.  I believe we can give her a more stable knee but she may still have pain.  I have asked her to use the full length knee brace to see if this helps with her pain.  If so, then tibial component revision may be wofth while.   If not, then the outcome would be more unpredictable.       MD Merlene Barros Family Professor  Oncology and Adult Reconstructive Surgery  Dept Orthopaedic Surgery, [facility]  [phone] office, [phone] pager  [email or web address]    Total Time = 15 min, 50% of which was spent in counseling and coordination of care as documented above.

## 2017-05-11 NOTE — PROGRESS NOTES
[facility] [place] [facility], Orthopaedic Surgery Consultation  by Harrison Tyler M.D.     Sharon Torrez MRN# [id number]   Age: 66 year old YOB: 1950      Requesting physician: Scott Garcia MD [facility]  Dr. Los Rios, PCP  Dragan Thurston MD [facility]           Sharon is still having pain that is debilitating and as noted at last visit, has been progressive.  She tries to manage her pain daily and regrets having done the L TKA as she feels her level of function is worse than before the TKA.  The severity is greater than her last visit w/ me.  Upon additional questioning, she does have sx's of instability but pain is her main complaint.  She does use a short hinge knee brace and has resisted using the larger full length hinge brace given it's inconvenience.  No bouts of swelling, warmth or hx of any injury.    EXAM: no effusion, warmth, 2+ LCL laxity and 2+ MCL laxity. 0-120 deg ROM. No quad atrophy.    XR: no significant XR change. ? Halo lucency about tibial component.    Recent Labs   Lab Test  08/25/16   1342   SED  7   CRP  <2.9   synovasure alpha defensin NEGATIVE     Recent Labs   Lab Test  05/04/17   1335  08/25/16   1300   FTYP  Left Knee  Synovial fluid   Left Knee     FNEU   --   24   FOTH  34   --    FCOL  Orange  Red   FAPR  Cloudy  Cloudy   FWBC  1722  No reference ranges have been established.  This result should be interpreted in   the context of the patient's clinical condition and compared to simultaneous   measurement in the patient's blood.  Refer to Lab Guide for specific   interpretive guidelines.    1344     Recent Labs   Lab Test  05/04/17   1335  08/25/16   1300   SDES  Left Knee Aspirate  Left Knee Aspirate  Left Knee Fluid  Left Knee Fluid   CULT  No growth  Culture negative monitoring continues  No anaerobes isolated  No growth   RPT  FINAL 05/09/2017  Pending  FINAL 09/08/2016  FINAL 08/30/2016         Assessment and Plan:   Assessment:  Painful L TKA, during early postop  period, etiology unclear and now worse than last visit. She also has some evidence of excessive laxity on examination.  There is no evidence of an infection.    Plan:  We had extensive discussion re risks pros/cons of exploration and possible tibial component revision.  I believe we can give her a more stable knee but she may still have pain.  I have asked her to use the full length knee brace to see if this helps with her pain.  If so, then tibial component revision may be wofth while.   If not, then the outcome would be more unpredictable.       MD Merlene Barros Family Professor  Oncology and Adult Reconstructive Surgery  Dept Orthopaedic Surgery, [facility]  [phone] office, [phone] pager  [email or web address]    Total Time = 15 min, 50% of which was spent in counseling and coordination of care as documented above.

## 2017-05-14 LAB — SYNOVASURE PERIPROSTHETIC JOINT INFECTION DECTION: NORMAL

## 2017-05-18 LAB
BACTERIA SPEC CULT: NORMAL
Lab: NORMAL
MICRO REPORT STATUS: NORMAL
SPECIMEN SOURCE: NORMAL

## 2018-01-19 DIAGNOSIS — M25.562 LEFT KNEE PAIN: Primary | ICD-10-CM

## 2018-01-24 ASSESSMENT — ENCOUNTER SYMPTOMS
STIFFNESS: 1
NECK PAIN: 1
NECK MASS: 0
SINUS PAIN: 0
SINUS CONGESTION: 1
BACK PAIN: 1
SMELL DISTURBANCE: 0
MUSCLE CRAMPS: 0
JOINT SWELLING: 1
HOARSE VOICE: 0
SORE THROAT: 1
MYALGIAS: 1
TROUBLE SWALLOWING: 0
MUSCLE WEAKNESS: 1
TASTE DISTURBANCE: 0
ARTHRALGIAS: 1

## 2018-01-24 ASSESSMENT — KOOS JR: HOW SEVERE IS YOUR KNEE STIFFNESS AFTER FIRST WAKING IN MORNING: SEVERE

## 2018-01-24 ASSESSMENT — ACTIVITIES OF DAILY LIVING (ADL): FUNCTION,_DAILY_LIVING_SCORE: 45.58

## 2018-01-25 ENCOUNTER — OFFICE VISIT (OUTPATIENT)
Dept: ORTHOPEDICS | Facility: CLINIC | Age: 68
End: 2018-01-25
Payer: COMMERCIAL

## 2018-01-25 ENCOUNTER — RADIANT APPOINTMENT (OUTPATIENT)
Dept: GENERAL RADIOLOGY | Facility: CLINIC | Age: 68
End: 2018-01-25
Attending: ORTHOPAEDIC SURGERY
Payer: COMMERCIAL

## 2018-01-25 VITALS — BODY MASS INDEX: 25.76 KG/M2 | WEIGHT: 140 LBS | HEIGHT: 62 IN

## 2018-01-25 DIAGNOSIS — M25.562 LEFT KNEE PAIN: ICD-10-CM

## 2018-01-25 DIAGNOSIS — T84.84XD PAIN DUE TO TOTAL KNEE REPLACEMENT, SUBSEQUENT ENCOUNTER: Primary | ICD-10-CM

## 2018-01-25 DIAGNOSIS — Z96.659 PAIN DUE TO TOTAL KNEE REPLACEMENT, SUBSEQUENT ENCOUNTER: Primary | ICD-10-CM

## 2018-01-25 RX ORDER — BIOTIN 10000 MCG
10 CAPSULE ORAL
Status: ON HOLD | COMMUNITY
Start: 2013-11-07 | End: 2021-08-18

## 2018-01-25 RX ORDER — AMOXICILLIN 500 MG/1
CAPSULE ORAL
COMMUNITY
Start: 2017-12-11

## 2018-01-25 RX ORDER — RISEDRONATE SODIUM 35 MG/1
35 TABLET, FILM COATED ORAL
COMMUNITY
Start: 2017-07-13 | End: 2024-01-16

## 2018-01-25 RX ORDER — CELECOXIB 100 MG/1
100-200 CAPSULE ORAL
Status: ON HOLD | COMMUNITY
Start: 2017-09-18 | End: 2018-05-09

## 2018-01-25 NOTE — NURSING NOTE
"Chief Complaint   Patient presents with     RECHECK     F/u Painful Left TKA.         67 year old  1950    Ht 1.575 m (5' 2\")  Wt 63.5 kg (140 lb)  BMI 25.61 kg/m2                [facility] [employer] [address] [place] [address], [place] [address] [address] [facility] [address] [facility] [address]    Allergies   Allergen Reactions     Celecoxib Other (See Comments)     Citalopram Diarrhea     Naproxen Other (See Comments)     Nsaids Other (See Comments)     Gastric ulcer     Ragweeds      Runny nose     Seasonal Allergies Other (See Comments)     Venlafaxine Other (See Comments)     Foggy headed     Vicodin [Hydrocodone-Acetaminophen] Hives     Bupropion Anxiety     Current Outpatient Prescriptions   Medication     amoxicillin (AMOXIL) 500 MG capsule     Biotin 10 MG CAPS     celecoxib (CELEBREX) 100 MG capsule     Mometasone Furoate 100 MCG/ACT AERO     Omega-3 Fatty Acids (OMEGA 3 500) 500 MG CAPS     RISEdronate (ACTONEL) 35 MG tablet     oxyCODONE (ROXICODONE) 10 MG IR tablet     hydrOXYzine (ATARAX) 25 MG tablet     clonazePAM (KLONOPIN) 0.5 MG tablet     Biotin 10 MG TABS tablet     spironolactone (ALDACTONE) 50 MG tablet     silver sulfADIAZINE (SILVADENE) 1 % cream     omeprazole (PRILOSEC) 20 MG CR capsule     meloxicam (MOBIC) 15 MG tablet     Dichloracetic Acid, [id number], (BICHLORACETIC ACID ALY EX)     RESVERATROL PO     Biotin w/ Vitamins C & E (HAIR/SKIN/NAILS PO)     calcium carbonate (RA CALCIUM) 500 MG tablet     cyclobenzaprine (FLEXERIL) 10 MG tablet     Glucosamine-Chondroitin-MSM (RA GLUCOSAMINE-CHONDROIT-MSM) 500-400-300 MG TABS     Multiple Vitamin (MULTI-VITAMINS) TABS     DHA-EPA-VITAMIN E PO     raloxifene (EVISTA) 60 MG tablet     traMADol (ULTRAM) 50 MG tablet     traZODone (DESYREL) 50 MG tablet     triamterene-hydrochlorothiazide (DYAZIDE) 37.5-25 MG per capsule     butalbital-acetaminophen-caffeine (FIORICET, ESGIC) -40 MG per tablet     albuterol (ALBUTEROL) 108 (90 BASE) MCG/ACT inhaler "     cetirizine (ZYRTEC) 10 MG tablet     Cholecalciferol (D 1000) 1000 UNITS CAPS     clindamycin-benzoyl peroxide (BENZACLIN) gel     SUMAtriptan (IMITREX) 100 MG tablet     topiramate (TOPAMAX) 100 MG tablet     metroNIDAZOLE (METROGEL) 1 % gel     No current facility-administered medications for this visit.          Questionnaires:      KOOS Knee Survey Assessment    Knee Outcome Survey ADL Scale (DENNY Rodriguez; SARAH Vo; ALDO Bradley; Marlon, JOEL; SHELTON Perera; 1998) 1/24/2018   Do you have swelling in your knee? Sometimes   Do you feel grinding, hear clicking or any other type of noise when your knee moves? Always   Does your knee catch or hang up when moving? Rarely   Can you straighten your knee fully? Always   Can you bend your knee fully? Always   How severe is your knee joint stiffness after first wakening in the morning? Severe   How severe is your knee stiffness after sitting, lying or resting LATER IN THE DAY? Severe   How often do you experience knee pain? Always   Twisting/pivoting on your knee Extreme   Straightening knee fully Severe   Bending knee fully Mild   Walking on flat surface Moderate   Going up or down stairs Moderate   At night while in bed Moderate   Sitting or lying Moderate   Standing upright Moderate   Descending stairs Moderate   Ascending stairs Moderate   Rising from sitting Moderate   Standing Moderate   Bending to floor/ an object Moderate   Walking on flat surface Moderate   Getting in/out of car Moderate   Going shopping Moderate   Putting on socks/stockings Moderate   Rising from bed Moderate   Taking off socks/stockings Moderate   Lying in bed (turning over, maintaining knee position) Severe   Getting in/out of bath Severe   Sitting Moderate   Getting on/off toilet Moderate   Heavy domestic duties (moving heavy boxes, scrubbing floors, etc) Severe   Light domestic duties (cooking, dusting, etc) Moderate   Squatting Extreme   Running Extreme   Jumping Extreme  "  Twisting/pivoting on your injured knee Extreme   Kneeling Extreme   How often are you aware of your knee problem? Constantly   Have you modified your life style to avoid potentially damaging activities to your knee? Totally   How much are you troubled with lack of confidence in your knee? Totally   In general, how much difficulty do you have with your knee? Extreme   Pain Score 38.88   Symptoms Score 25   Function, Daily Living Score 45.58   Sports and Rec Score 0   Quality of Life Score 0            Promis 10 Assessment    No flowsheet data found.         Ortho Oxford Knee Questionnaire    Oxford Knee Score (  [employer], 1998. All rights reserved) - Problems with knee during last 4 Weeks 1/24/2018   1.  How would you describe the pain you usually have from your knee? Moderate   2.  Have you had any trouble with washing and drying yourself (all over) because of your knee? Very little trouble   3.  Have you had any trouble getting in and out of a car or using public transportation because of your knee? (whichever you would tend to use) Moderate trouble   4.  For how long have you been able to walk before pain from you knee becomes severe? (with or without cane) 5 to 15 minutes   5.  After a meal (sitting at a table), how painful has it been for you to stand up from a chair because of your knee? Very painful   6.  Have you been limping when walking because of your knee? Most of the time   7.  Could you kneel down and get up again afterwards? With extreme difficulty   8.  Have you been troubled by pain from your knee in bed at night? Every night   9.  How much has pain from your knee interfered with your usual work (including housework)? Greatly   10. Have you felt that your knee might suddenly \"give out\" or let you down? Most of the time   11. Could you do the grocery shopping on your own? With moderate difficulty   12. Could you walk down one flight of stairs? With moderate difficulty   OXFORD TOTAL " SCORE 17            Hilary Zacarias C.M.A.

## 2018-01-25 NOTE — PROGRESS NOTES
Orthopaedic Oncology and Adult Reconstructive (joint replacement) Surgery   Clinic visit -  Harrison Tyler M.D.      DX:  1. R Hallux rigidus  2. DJD bilateral knees with varus deformity.     TREATMENTS:  1. 1/4/2013, Right first metatarsophalangeal arthrodesis ([facility]) [facility]  2. 11/16/15, L TKA ([facility] outpt surgery) Scott Wan MD [facility]    Hx:  Still having marked pain in L TKA.  Very unhappy and describes pain as excruciating.  She is getting depressed over her situation.  She is quite frustrated.  She feels that the right knee joint is now becoming a problem as she is compensating for the malfunctioning left knee.  I have asked her to use a long-leg brace on the left knee due to some varus and valgus instability however she finds this inconvenient and has a trouble fitting it has a brace will continue to slide down her leg.    She performed she did have a second opinion performed by Dr. Fletcher at the [facility].  His opinion was similar in that there may be some excessive laxity of the collateral ligaments.  They had described the use of a brace as well.    Exam:  Gait is normal.  No warmth or erythema or effusion about the left knee joint.  5  of hyperextension with further flexion 120 .  In extension, no collateral ligament laxity.  In mid flexion however 2+ opening of both the MCL and lateral collateral ligament.  She is symptomatic with this maneuver.    5/2017: Infection w/u (-)  Bone scan:  ? Tibial component loosening  CT: no lucencies  XR: no changes, stable implant.    EXAM:  2+ varus/valgus laxity in mid flexion  0-110 deg ROM    Impression:  Symptoms most likely related to mid flexion instability symptoms with laxity of collateral ligaments.    Recommendation:  I advised her to use the long-leg brace as this will control for any sagittal plane and collateral ligament laxity.  If her symptoms are better after using the brace and this would be a positive predictor for a successful outcome  after a surgical procedure to increase her knee stability.  I informed her I would consider proceeding with exploring her knee to assess intraoperatively the implants stability and collateral ligament laxity.  I suspect her symptoms are related to her mid flexion instability and therefore revision to a thicker polyethylene bearing surface, with or without tibial component revision as necessary, is most likely to benefit her.    She will return to see me in 3 months time after trial usage of the long leg brace which I custom fitted to her today.    We will also obtain the consultation note by Dr. Fletcher from the [facility] for review.    PT Rx for quad strengthening and progressive resistance exercises    MD Merlene Barros Family Professor  Oncology and Adult Reconstructive Surgery  Dept Orthopaedic Surgery, [facility]  [phone] office, [phone] pager  [email or web address]    Total Time = 25 min, 50% of which was spent in counseling and coordination of care as documented above.      KOOS  Pain Score (range: 0 - 100)  38.88      Symptoms Score (range: 0 - 100)  25     Function, Daily Living Score (range: 0 - 100)  45.58     Sports and Rec Score (range: 0 - 100)  0     Quality of Life Score (range: 0 - 100)  0     Total Oxford Knee Score (range: 0 - 48)  17       Answers for HPI/ROS submitted by the patient on 1/24/2018   General Symptoms: No  Skin Symptoms: No  HENT Symptoms: Yes  EYE SYMPTOMS: No  HEART SYMPTOMS: No  LUNG SYMPTOMS: No  INTESTINAL SYMPTOMS: No  URINARY SYMPTOMS: No  GYNECOLOGIC SYMPTOMS: No  BREAST SYMPTOMS: No  SKELETAL SYMPTOMS: Yes  BLOOD SYMPTOMS: No  NERVOUS SYSTEM SYMPTOMS: No  MENTAL HEALTH SYMPTOMS: No  Ear pain: Yes  Ear discharge: No  Hearing loss: No  Tinnitus: No  Nosebleeds: No  Congestion: Yes  Sinus pain: No  Trouble swallowing: No   Voice hoarseness: No  Mouth sores: No  Sore throat: Yes  Tooth pain: No  Gum tenderness: No  Bleeding gums: No  Change in taste:  No  Change in sense of smell: No  Dry mouth: Yes  Hearing aid used: No  Neck lump: No  Back pain: Yes  Muscle aches: Yes  Neck pain: Yes  Swollen joints: Yes  Joint pain: Yes  Bone pain: Yes  Muscle cramps: No  Muscle weakness: Yes  Joint stiffness: Yes  Bone fracture: No

## 2018-01-25 NOTE — MR AVS SNAPSHOT
After Visit Summary   1/25/2018    Sharon Torrez    MRN: [id number]           Patient Information     Date Of Birth          1950        Visit Information        Provider Department      1/25/2018 1:00 PM Harrison Tyler MD [facility]        Today's Diagnoses     Pain due to total knee replacement, subsequent encounter    -  1       Follow-ups after your visit        Additional Services     PHYSICAL THERAPY REFERRAL (External-Prints)       Physical Therapy Referral                  Who to contact     Please call your clinic at [phone] to:    Ask questions about your health    Make or cancel appointments    Discuss your medicines    Learn about your test results    Speak to your doctor   If you have compliments or concerns about an experience at your clinic, or if you wish to file a complaint, please contact [facility] Patient Relations at [phone] or email us at [email or web address]         Additional Information About Your Visit        MyChart Information     [employer] gives you secure access to your electronic health record. If you see a primary care provider, you can also send messages to your care team and make appointments. If you have questions, please call your primary care clinic.  If you do not have a primary care provider, please call [phone] and they will assist you.      [employer] is an electronic gateway that provides easy, online access to your medical records. With [employer], you can request a clinic appointment, read your test results, renew a prescription or communicate with your care team.     To access your existing account, please contact your [facility] or call [phone] for assistance.        Care EveryWhere ID     This is your Care EveryWhere ID. This could be used by other organizations to access your [facility] medical records  [id number]        Your Vitals Were     Height BMI  "(Body Mass Index)                1.575 m (5' 2\") 25.61 kg/m2           Blood Pressure from Last 3 Encounters:   05/04/17 (!) 138/91   01/04/13 131/72    Weight from Last 3 Encounters:   01/25/18 63.5 kg (140 lb)   05/11/17 65.4 kg (144 lb 3.2 oz)   04/17/17 65 kg (143 lb 4.8 oz)              We Performed the Following     PHYSICAL THERAPY REFERRAL (External-Prints)        Primary Care Provider Office Phone # Fax #    [place] [phone]       XXX RETIRED  [address] [facility] [address]        Equal Access to Services     BRIGITTE PENALOZA : Hadii eusebio Hall, wachungda cheyenne, qaybta kaalmada adechineduyamelanie, layla rodrigues . So [facility] [phone].    ATENCIÓN: Si habla español, tiene a sabillon disposición servicios gratuitos de asistencia lingüística. Llame al [phone].    We comply with applicable federal civil rights laws and [place] laws. We do not discriminate on the basis of race, color, national origin, age, disability, sex, sexual orientation, or gender identity.            Thank you!     Thank you for choosing [facility]  for your care. Our goal is always to provide you with excellent care. Hearing back from our patients is one way we can continue to improve our services. Please take a few minutes to complete the written survey that you may receive in the mail after your visit with us. Thank you!             Your Updated Medication List - Protect others around you: Learn how to safely use, store and throw away your medicines at www.disposemymeds.org.          This list is accurate as of 1/25/18  4:13 PM.  Always use your most recent med list.                   Brand Name Dispense Instructions for use Diagnosis    amoxicillin 500 MG capsule    AMOXIL     Take 2g hour before dental visit.        BICHLORACETIC ACID LAY EX      shefali daily    Painful total knee replacement, initial encounter (H)       * Biotin 10 MG Tabs " tablet      Take 10 mg by mouth        * Biotin 10 MG Caps      Take 10 mg by mouth        butalbital-acetaminophen-caffeine -40 MG per tablet    FIORICET/ESGIC          celecoxib 100 MG capsule    celeBREX     Take 100-200 mg by mouth        cetirizine 10 MG tablet    zyrTEC     Take 10 mg by mouth        clindamycin-benzoyl peroxide gel    BENZACLIN     Apply to skin daily hs        clonazePAM 0.5 MG tablet    klonoPIN     Take 0.5 mg by mouth        cyclobenzaprine 10 MG tablet    FLEXERIL          D 1000 1000 UNITS Caps      Take 3 capsules by mouth        DHA-EPA-VITAMIN E PO           EVISTA 60 MG tablet   Generic drug:  raloxifene      Take 60 mg by mouth        HAIR/SKIN/NAILS PO       Painful total knee replacement, initial encounter (H)       hydrOXYzine 25 MG tablet    ATARAX     Take 25 mg by mouth        meloxicam 15 MG tablet    MOBIC     Take 15 mg by mouth    Painful total knee replacement, initial encounter (H)       metroNIDAZOLE 1 % gel    METROGEL     Apply topically daily        Mometasone Furoate 100 MCG/ACT Aero      Inhale 100 mcg into the lungs        MULTI-VITAMINS Tabs           OMEGA 3 500 500 MG Caps           omeprazole 20 MG CR capsule    priLOSEC     Take 20 mg by mouth    Painful total knee replacement, initial encounter (H)       oxyCODONE IR 10 MG tablet    ROXICODONE     TK ONE T PO BID FOR A MONTH FOR POST OP PAIN        PROAIR  (90 BASE) MCG/ACT Inhaler   Generic drug:  albuterol      Inhale 2 puffs into the lungs        RA CALCIUM 1250 MG tablet   Generic drug:  calcium carbonate           RA GLUCOSAMINE-CHONDROIT--400-300 MG Tabs   Generic drug:  Glucosamine-Chondroitin-MSM           RESVERATROL PO      Resveratrol liquid daily    Painful total knee replacement, initial encounter (H)       RISEdronate 35 MG tablet    ACTONEL     Take 35 mg by mouth        silver sulfADIAZINE 1 % cream    SILVADENE      Painful total knee replacement, initial encounter (H)        spironolactone 50 MG tablet    ALDACTONE     Take 50 mg by mouth    Painful total knee replacement, initial encounter (H)       SUMAtriptan 100 MG tablet    IMITREX     TK 1 T PO AT ONSET OF HEADACHE UTD        TOPAMAX 100 MG tablet   Generic drug:  topiramate           traMADol 50 MG tablet    ULTRAM     Take 50 mg by mouth        traZODone 50 MG tablet    DESYREL     Take 100 mg by mouth        triamterene-hydrochlorothiazide 37.5-25 MG per capsule    DYAZIDE     Take 1 capsule by mouth        * Notice:  This list has 2 medication(s) that are the same as other medications prescribed for you. Read the directions carefully, and ask your doctor or other care provider to review them with you.

## 2018-01-25 NOTE — LETTER
1/25/2018       RE: Sharon Torrez  [address] [place] [address]     Dear Colleague,    Thank you for referring your patient, Sharon Torrez, to the [facility] [place] [facility] at [facility]. Please see a copy of my visit note below.        Orthopaedic Oncology and Adult Reconstructive (joint replacement) Surgery   Clinic visit -  Harrison Tyler M.D.      DX:  1. R Hallux rigidus  2. DJD bilateral knees with varus deformity.     TREATMENTS:  1. 1/4/2013, Right first metatarsophalangeal arthrodesis ([facility]) [facility]  2. 11/16/15, L TKA ([facility] outpt surgery) Scott Wan MD [facility]    Hx:  Still having marked pain in L TKA.  Very unhappy and describes pain as excruciating.  She is getting depressed over her situation.  She is quite frustrated.  She feels that the right knee joint is now becoming a problem as she is compensating for the malfunctioning left knee.  I have asked her to use a long-leg brace on the left knee due to some varus and valgus instability however she finds this inconvenient and has a trouble fitting it has a brace will continue to slide down her leg.    She performed she did have a second opinion performed by Dr. Fletcher at the [facility].  His opinion was similar in that there may be some excessive laxity of the collateral ligaments.  They had described the use of a brace as well.    Exam:  Gait is normal.  No warmth or erythema or effusion about the left knee joint.  5  of hyperextension with further flexion 120 .  In extension, no collateral ligament laxity.  In mid flexion however 2+ opening of both the MCL and lateral collateral ligament.  She is symptomatic with this maneuver.    5/2017: Infection w/u (-)  Bone scan:  ? Tibial component loosening  CT: no lucencies  XR: no changes, stable implant.    EXAM:  2+ varus/valgus laxity in mid flexion  0-110 deg ROM    Impression:  Symptoms most likely related to mid flexion instability symptoms  with laxity of collateral ligaments.    Recommendation:  I advised her to use the long-leg brace as this will control for any sagittal plane and collateral ligament laxity.  If her symptoms are better after using the brace and this would be a positive predictor for a successful outcome after a surgical procedure to increase her knee stability.  I informed her I would consider proceeding with exploring her knee to assess intraoperatively the implants stability and collateral ligament laxity.  I suspect her symptoms are related to her mid flexion instability and therefore revision to a thicker polyethylene bearing surface, with or without tibial component revision as necessary, is most likely to benefit her.    She will return to see me in 3 months time after trial usage of the long leg brace which I custom fitted to her today.    We will also obtain the consultation note by Dr. Fletcher from the [facility] for review.    PT Rx for quad strengthening and progressive resistance exercises      Again, thank you for allowing me to participate in the care of your patient.      Sincerely,    Harrison Tyler MD

## 2018-05-09 ENCOUNTER — HOSPITAL ENCOUNTER (OUTPATIENT)
Facility: CLINIC | Age: 68
Discharge: HOME | End: 2018-05-09
Attending: COLON & RECTAL SURGERY | Admitting: COLON & RECTAL SURGERY
Payer: MEDICARE

## 2018-05-09 ENCOUNTER — SURGERY (OUTPATIENT)
Age: 68
End: 2018-05-09

## 2018-05-09 VITALS
BODY MASS INDEX: 25.21 KG/M2 | WEIGHT: 137 LBS | RESPIRATION RATE: 11 BRPM | DIASTOLIC BLOOD PRESSURE: 77 MMHG | HEIGHT: 62 IN | SYSTOLIC BLOOD PRESSURE: 110 MMHG | OXYGEN SATURATION: 100 %

## 2018-05-09 LAB — COLONOSCOPY: NORMAL

## 2018-05-09 PROCEDURE — 88305 TISSUE EXAM BY PATHOLOGIST: CPT | Mod: 26 | Performed by: COLON & RECTAL SURGERY

## 2018-05-09 PROCEDURE — 25000128 H RX IP 250 OP 636: Performed by: COLON & RECTAL SURGERY

## 2018-05-09 PROCEDURE — 88305 TISSUE EXAM BY PATHOLOGIST: CPT | Performed by: COLON & RECTAL SURGERY

## 2018-05-09 PROCEDURE — 45385 COLONOSCOPY W/LESION REMOVAL: CPT | Performed by: COLON & RECTAL SURGERY

## 2018-05-09 PROCEDURE — G0500 MOD SEDAT ENDO SERVICE >5YRS: HCPCS | Performed by: COLON & RECTAL SURGERY

## 2018-05-09 RX ORDER — FLUMAZENIL 0.1 MG/ML
0.2 INJECTION, SOLUTION INTRAVENOUS
Status: CANCELLED | OUTPATIENT
Start: 2018-05-09 | End: 2018-05-09

## 2018-05-09 RX ORDER — FENTANYL CITRATE 50 UG/ML
INJECTION, SOLUTION INTRAMUSCULAR; INTRAVENOUS PRN
Status: DISCONTINUED | OUTPATIENT
Start: 2018-05-09 | End: 2018-05-09 | Stop reason: HOSPADM

## 2018-05-09 RX ORDER — NALOXONE HYDROCHLORIDE 0.4 MG/ML
.1-.4 INJECTION, SOLUTION INTRAMUSCULAR; INTRAVENOUS; SUBCUTANEOUS
Status: CANCELLED | OUTPATIENT
Start: 2018-05-09 | End: 2018-05-10

## 2018-05-09 RX ORDER — ONDANSETRON 4 MG/1
4 TABLET, ORALLY DISINTEGRATING ORAL EVERY 6 HOURS PRN
Status: CANCELLED | OUTPATIENT
Start: 2018-05-09

## 2018-05-09 RX ORDER — ONDANSETRON 2 MG/ML
4 INJECTION INTRAMUSCULAR; INTRAVENOUS EVERY 6 HOURS PRN
Status: CANCELLED | OUTPATIENT
Start: 2018-05-09

## 2018-05-09 RX ORDER — ONDANSETRON 2 MG/ML
4 INJECTION INTRAMUSCULAR; INTRAVENOUS
Status: DISCONTINUED | OUTPATIENT
Start: 2018-05-09 | End: 2018-05-09 | Stop reason: HOSPADM

## 2018-05-09 RX ORDER — LIDOCAINE 40 MG/G
CREAM TOPICAL
Status: DISCONTINUED | OUTPATIENT
Start: 2018-05-09 | End: 2018-05-09 | Stop reason: HOSPADM

## 2018-05-09 RX ADMIN — FENTANYL CITRATE 100 MCG: 50 INJECTION, SOLUTION INTRAMUSCULAR; INTRAVENOUS at 10:59

## 2018-05-09 RX ADMIN — MIDAZOLAM 1 MG: 1 INJECTION INTRAMUSCULAR; INTRAVENOUS at 11:08

## 2018-05-09 RX ADMIN — MIDAZOLAM 2 MG: 1 INJECTION INTRAMUSCULAR; INTRAVENOUS at 10:59

## 2018-05-09 RX ADMIN — FENTANYL CITRATE 50 MCG: 50 INJECTION, SOLUTION INTRAMUSCULAR; INTRAVENOUS at 11:11

## 2018-05-09 NOTE — BRIEF OP NOTE
[facility] Brief Operative Note    Pre-operative diagnosis: FAMILY HISTORY OF COLON POLYPS   Post-operative diagnosis cecal polyp, diverticulosis     Procedure: Procedure(s):  COLONOSCOPY - Wound Class: II-Clean Contaminated   Surgeon(s): Surgeon(s) and Role:     * Soraya Taylor MD - Primary   Estimated blood loss: * No values recorded between 5/9/2018 12:00 AM and 5/9/2018 11:31 AM *    Specimens:   ID Type Source Tests Collected by Time Destination   A :  Polyp Large Intestine, Cecum SURGICAL PATHOLOGY EXAM Soraya Taylor MD 5/9/2018 11:23 AM       Findings: See Provation procedure note in Epic

## 2018-05-09 NOTE — H&P
Pre-Endoscopy History and Physical     Sharon Torrez MRN# [id number]   YOB: 1950 Age: 68 year old     Date of Procedure: 5/9/2018  Primary care provider: Sujata Earl  Type of Endoscopy: colonoscopy  Reason for Procedure: screening  Type of Anesthesia Anticipated: moderate sedation    HPI:    Sharon is a 68 year old female who will be undergoing the above procedure.  Patient denies a change in her bowel habits or bleeding. She does note that she has a long standing tendency to loose stool.    A history and physical has been performed. The patient's medications and allergies have been reviewed. The risks and benefits of the procedure and the sedation options and risks were discussed with the patient.  All questions were answered and informed consent was obtained.      She denies a personal or family history of anesthesia complications or bleeding disorders.   Prior to Admission medications    Medication Sig Start Date End Date Taking? Authorizing Provider   Biotin 10 MG TABS tablet Take 10 mg by mouth 11/7/13  Yes Reported, Patient   butalbital-acetaminophen-caffeine (FIORICET, ESGIC) -40 MG per tablet  12/22/14  Yes Reported, Patient   clonazePAM (KLONOPIN) 0.5 MG tablet Take 0.5 mg by mouth 3/9/17  Yes Reported, Patient   Fexofenadine HCl (ALLEGRA PO)    Yes Reported, Patient   spironolactone (ALDACTONE) 50 MG tablet Take 50 mg by mouth 3/9/17  Yes Reported, Patient   SUMAtriptan (IMITREX) 100 MG tablet TK 1 T PO AT ONSET OF HEADACHE UTD 6/30/16  Yes Reported, Patient   topiramate (TOPAMAX) 100 MG tablet  6/20/08  Yes Reported, Patient   traMADol (ULTRAM) 50 MG tablet Take 50 mg by mouth 9/22/15  Yes Reported, Patient   traZODone (DESYREL) 50 MG tablet Take 100 mg by mouth 10/20/15  Yes Reported, Patient   triamterene-hydrochlorothiazide (DYAZIDE) 37.5-25 MG per capsule Take 1 capsule by mouth 3/10/16  Yes Reported, Patient   albuterol (ALBUTEROL) 108 (90 BASE) MCG/ACT inhaler Inhale 2 puffs  into the lungs 10/20/15   Reported, Patient   amoxicillin (AMOXIL) 500 MG capsule Take 2g hour before dental visit. 12/11/17   Reported, Patient   Biotin 10 MG CAPS Take 10 mg by mouth 11/7/13   Reported, Patient   Biotin w/ Vitamins C & E (HAIR/SKIN/NAILS PO)     Reported, Patient   calcium carbonate (RA CALCIUM) 500 MG tablet  10/22/08   Reported, Patient   Cholecalciferol (D 1000) 1000 UNITS CAPS Take 3 capsules by mouth 10/28/10   Reported, Patient   clindamycin-benzoyl peroxide (BENZACLIN) gel Apply to skin daily hs 10/20/15   Reported, Patient   cyclobenzaprine (FLEXERIL) 10 MG tablet  2/2/15   Reported, Patient   DHA-EPA-VITAMIN E PO  10/23/09   Reported, Patient   Dichloracetic Acid, [id number], (BICHLORACETIC ACID ALY EX) bichlor daily 6/16/15   Reported, Patient   Glucosamine-Chondroitin-MSM (RA GLUCOSAMINE-CHONDROIT-MSM) 500-400-300 MG TABS  6/20/08   Reported, Patient   hydrOXYzine (ATARAX) 25 MG tablet Take 25 mg by mouth 4/27/17   Reported, Patient   meloxicam (MOBIC) 15 MG tablet Take 15 mg by mouth 4/12/17   Reported, Patient   metroNIDAZOLE (METROGEL) 1 % gel Apply topically daily    Reported, Patient   Mometasone Furoate 100 MCG/ACT AERO Inhale 100 mcg into the lungs 6/21/17   Reported, Patient   Multiple Vitamin (MULTI-VITAMINS) TABS  10/22/08   Reported, Patient   Omega-3 Fatty Acids (OMEGA 3 500) 500 MG CAPS  10/23/09   Reported, Patient   omeprazole (PRILOSEC) 20 MG CR capsule Take 20 mg by mouth 3/9/17   Reported, Patient   raloxifene (EVISTA) 60 MG tablet Take 60 mg by mouth 11/7/13   Reported, Patient   RESVERATROL PO Resveratrol liquid daily 12/21/12   Reported, Patient   RISEdronate (ACTONEL) 35 MG tablet Take 35 mg by mouth 7/13/17   Reported, Patient   silver sulfADIAZINE (SILVADENE) 1 % cream  3/9/17   Reported, Patient       Allergies   Allergen Reactions     Celecoxib Other (See Comments)     Citalopram Diarrhea     Naproxen Other (See Comments)     Nsaids Other (See Comments)      Gastric ulcer     Ragweeds      Runny nose     Seasonal Allergies Other (See Comments)     Venlafaxine Other (See Comments)     Foggy headed     Vicodin [Hydrocodone-Acetaminophen] Hives     Bupropion Anxiety        Current Facility-Administered Medications   Medication     lidocaine (LMX4) cream     lidocaine 1 % 1 mL     ondansetron (ZOFRAN) injection 4 mg     sodium chloride (PF) 0.9% PF flush 3 mL     sodium chloride (PF) 0.9% PF flush 3 mL       Patient Active Problem List   Diagnosis     Hallux rigidus     Acute gastric ulcer     Anxiety state     Benign neoplasm of cerebral meninges (H)     Depression     Intramural leiomyoma of uterus     Herniated lumbar intervertebral disc     Moderate episode of recurrent major depressive disorder (H)     Encounter for screening for malignant neoplasm of colon     Encounter for screening mammogram for malignant neoplasm of breast     Osteoporosis     Advance directive discussed with patient     Calculus of kidney     Complete tear of left rotator cuff     Family history of cardiovascular disease     Insomnia     Migraine without status migrainosus, not intractable     Tear of supraspinatus tendon        Past Medical History:   Diagnosis Date     Arthritis      Asthma      Atypical chest pain      Hypercholesteremia      IBD (inflammatory bowel disease)      Migraines      Osteopenia      Renal disease     history kidney stones     Restless leg         Past Surgical History:   Procedure Laterality Date     ARTHRODESIS FOOT  1/4/2013    Procedure: ARTHRODESIS FOOT;  RIGHT FIRST METATARSAL PHALANGEAL JOINT ARTHRODESIS;  Surgeon: Rupert Jacobo MD;  Location: [facility] OR     GYN SURGERY      csection     KNEE SURGERY         Social History   Substance Use Topics     Smoking status: Never Smoker     Smokeless tobacco: Never Used     Alcohol use Yes      Comment: occasional        Family History   Problem Relation Age of Onset     OSTEOPOROSIS Mother      Hyperlipidemia  "Mother      Colon Polyps Brother        REVIEW OF SYSTEMS:     5 point ROS negative except as noted above in HPI, including Gen., Resp., CV, GI &  system review. Has knee and shoulder pain.    PHYSICAL EXAM:   BP (!) 127/96  Resp 16  Ht 1.575 m (5' 2\")  Wt 62.1 kg (137 lb)  SpO2 100%  BMI 25.06 kg/m2 Estimated body mass index is 25.06 kg/(m^2) as calculated from the following:    Height as of this encounter: 1.575 m (5' 2\").    Weight as of this encounter: 62.1 kg (137 lb).   GENERAL APPEARANCE: healthy  MENTAL STATUS: alert  AIRWAY EXAM: Mallampatti Class III (visualization of the soft palate and base of uvula)  RESP: lungs clear to auscultation - no rales, rhonchi or wheezes  CV: regular rates and rhythm      DIAGNOSTICS:    Not indicated      IMPRESSION   ASA Class 2 - Mild systemic disease        PLAN:       Colonoscopy with possible polypectomy, possible biopsy. The indications, procedure and risks were explained to the patient who agrees to proceed.       The above has been forwarded to the consulting provider.      Signed Electronically by: Soraya Taylor  May 9, 2018          "

## 2018-05-10 LAB — COPATH REPORT: NORMAL

## 2019-09-18 ENCOUNTER — ANESTHESIA EVENT (OUTPATIENT)
Dept: SURGERY | Facility: CLINIC | Age: 69
End: 2019-09-18

## 2019-09-29 ENCOUNTER — HEALTH MAINTENANCE LETTER (OUTPATIENT)
Age: 69
End: 2019-09-29

## 2019-11-14 ENCOUNTER — ANESTHESIA (OUTPATIENT)
Dept: SURGERY | Facility: CLINIC | Age: 69
End: 2019-11-14

## 2021-06-15 DIAGNOSIS — Z11.59 ENCOUNTER FOR SCREENING FOR OTHER VIRAL DISEASES: ICD-10-CM

## 2021-08-16 ENCOUNTER — LAB (OUTPATIENT)
Dept: LAB | Facility: CLINIC | Age: 71
End: 2021-08-16
Attending: COLON & RECTAL SURGERY
Payer: MEDICARE

## 2021-08-16 DIAGNOSIS — Z11.59 ENCOUNTER FOR SCREENING FOR OTHER VIRAL DISEASES: ICD-10-CM

## 2021-08-16 PROCEDURE — U0005 INFEC AGEN DETEC AMPLI PROBE: HCPCS

## 2021-08-16 PROCEDURE — U0003 INFECTIOUS AGENT DETECTION BY NUCLEIC ACID (DNA OR RNA); SEVERE ACUTE RESPIRATORY SYNDROME CORONAVIRUS 2 (SARS-COV-2) (CORONAVIRUS DISEASE [COVID-19]), AMPLIFIED PROBE TECHNIQUE, MAKING USE OF HIGH THROUGHPUT TECHNOLOGIES AS DESCRIBED BY CMS-2020-01-R: HCPCS

## 2021-08-17 LAB — SARS-COV-2 RNA RESP QL NAA+PROBE: NEGATIVE

## 2021-08-18 ENCOUNTER — HOSPITAL ENCOUNTER (OUTPATIENT)
Facility: CLINIC | Age: 71
Discharge: HOME | End: 2021-08-18
Attending: COLON & RECTAL SURGERY | Admitting: COLON & RECTAL SURGERY
Payer: MEDICARE

## 2021-08-18 VITALS
RESPIRATION RATE: 11 BRPM | WEIGHT: 160 LBS | HEART RATE: 68 BPM | DIASTOLIC BLOOD PRESSURE: 82 MMHG | SYSTOLIC BLOOD PRESSURE: 121 MMHG | OXYGEN SATURATION: 99 % | BODY MASS INDEX: 29.44 KG/M2 | HEIGHT: 62 IN

## 2021-08-18 PROBLEM — G44.229 CHRONIC TENSION HEADACHE: Status: ACTIVE | Noted: 2018-05-04

## 2021-08-18 PROBLEM — Q25.40 ABNORMALITY OF THORACIC AORTA: Status: ACTIVE | Noted: 2019-06-19

## 2021-08-18 PROBLEM — N18.30 STAGE 3 CHRONIC KIDNEY DISEASE (H): Status: ACTIVE | Noted: 2017-08-03

## 2021-08-18 PROBLEM — G44.40 ANALGESIC REBOUND HEADACHE: Status: ACTIVE | Noted: 2018-05-04

## 2021-08-18 PROBLEM — I10 ESSENTIAL HYPERTENSION: Status: ACTIVE | Noted: 2019-12-28

## 2021-08-18 PROBLEM — Q21.12 PATENT FORAMEN OVALE: Status: ACTIVE | Noted: 2020-08-25

## 2021-08-18 PROBLEM — M75.121 NONTRAUMATIC COMPLETE TEAR OF RIGHT ROTATOR CUFF: Status: ACTIVE | Noted: 2019-09-17

## 2021-08-18 PROBLEM — E78.00 ELEVATED LDL CHOLESTEROL LEVEL: Status: ACTIVE | Noted: 2018-07-31

## 2021-08-18 PROBLEM — T39.95XA ANALGESIC REBOUND HEADACHE: Status: ACTIVE | Noted: 2018-05-04

## 2021-08-18 PROBLEM — Z87.11 HISTORY OF GASTRIC ULCER: Status: ACTIVE | Noted: 2019-12-28

## 2021-08-18 PROBLEM — M81.0 HIGH RISK FOR FRACTURE DUE TO OSTEOPOROSIS BY DEXA SCAN: Status: ACTIVE | Noted: 2017-07-13

## 2021-08-18 PROBLEM — M19.031 PRIMARY OSTEOARTHRITIS OF BOTH WRISTS: Status: ACTIVE | Noted: 2017-12-06

## 2021-08-18 PROBLEM — K58.0 IRRITABLE BOWEL SYNDROME WITH DIARRHEA: Status: ACTIVE | Noted: 2019-03-06

## 2021-08-18 PROBLEM — T84.099A MECHANICAL COMPLICATION OF INTERNAL JOINT PROSTHESIS (H): Status: ACTIVE | Noted: 2021-02-04

## 2021-08-18 PROBLEM — I82.411 ACUTE DEEP VEIN THROMBOSIS (DVT) OF FEMORAL VEIN OF RIGHT LOWER EXTREMITY (H): Status: ACTIVE | Noted: 2019-12-28

## 2021-08-18 PROBLEM — J45.20 MILD INTERMITTENT ASTHMA: Status: ACTIVE | Noted: 2017-07-28

## 2021-08-18 PROBLEM — M19.032 PRIMARY OSTEOARTHRITIS OF BOTH WRISTS: Status: ACTIVE | Noted: 2017-12-06

## 2021-08-18 PROBLEM — Z60.0 PROBLEMS OF ADJUSTMENT TO LIFE-CYCLE TRANSITIONS: Status: ACTIVE | Noted: 2019-12-28

## 2021-08-18 PROBLEM — K57.90 DIVERTICULOSIS: Status: ACTIVE | Noted: 2020-08-25

## 2021-08-18 PROBLEM — Z79.899 CONTROLLED SUBSTANCE AGREEMENT SIGNED: Status: ACTIVE | Noted: 2018-03-06

## 2021-08-18 PROBLEM — Z98.890 S/P ROTATOR CUFF SURGERY: Status: ACTIVE | Noted: 2019-12-28

## 2021-08-18 LAB — COLONOSCOPY: NORMAL

## 2021-08-18 PROCEDURE — 250N000011 HC RX IP 250 OP 636: Performed by: COLON & RECTAL SURGERY

## 2021-08-18 PROCEDURE — 99153 MOD SED SAME PHYS/QHP EA: CPT | Performed by: COLON & RECTAL SURGERY

## 2021-08-18 PROCEDURE — 45380 COLONOSCOPY AND BIOPSY: CPT | Performed by: COLON & RECTAL SURGERY

## 2021-08-18 PROCEDURE — G0500 MOD SEDAT ENDO SERVICE >5YRS: HCPCS | Performed by: COLON & RECTAL SURGERY

## 2021-08-18 PROCEDURE — 88305 TISSUE EXAM BY PATHOLOGIST: CPT | Mod: TC | Performed by: COLON & RECTAL SURGERY

## 2021-08-18 RX ORDER — PRAMIPEXOLE DIHYDROCHLORIDE 0.12 MG/1
0.12 TABLET ORAL 3 TIMES DAILY
COMMUNITY
End: 2024-01-16

## 2021-08-18 RX ORDER — BUDESONIDE AND FORMOTEROL FUMARATE DIHYDRATE 160; 4.5 UG/1; UG/1
2 AEROSOL RESPIRATORY (INHALATION) 2 TIMES DAILY
COMMUNITY
End: 2024-01-16

## 2021-08-18 RX ORDER — AZELASTINE 1 MG/ML
1 SPRAY, METERED NASAL 2 TIMES DAILY
COMMUNITY

## 2021-08-18 RX ORDER — OMEPRAZOLE 10 MG/1
20 CAPSULE, DELAYED RELEASE ORAL DAILY
COMMUNITY
Start: 2021-08-16

## 2021-08-18 RX ORDER — LIDOCAINE 40 MG/G
CREAM TOPICAL
Status: DISCONTINUED | OUTPATIENT
Start: 2021-08-18 | End: 2021-08-18 | Stop reason: HOSPADM

## 2021-08-18 RX ORDER — NALOXONE HYDROCHLORIDE 0.4 MG/ML
0.2 INJECTION, SOLUTION INTRAMUSCULAR; INTRAVENOUS; SUBCUTANEOUS
Status: CANCELLED | OUTPATIENT
Start: 2021-08-18

## 2021-08-18 RX ORDER — NALOXONE HYDROCHLORIDE 0.4 MG/ML
0.4 INJECTION, SOLUTION INTRAMUSCULAR; INTRAVENOUS; SUBCUTANEOUS
Status: CANCELLED | OUTPATIENT
Start: 2021-08-18

## 2021-08-18 RX ORDER — FENTANYL CITRATE 50 UG/ML
INJECTION, SOLUTION INTRAMUSCULAR; INTRAVENOUS PRN
Status: COMPLETED | OUTPATIENT
Start: 2021-08-18 | End: 2021-08-18

## 2021-08-18 RX ORDER — ONDANSETRON 4 MG/1
4 TABLET, ORALLY DISINTEGRATING ORAL EVERY 6 HOURS PRN
Status: CANCELLED | OUTPATIENT
Start: 2021-08-18

## 2021-08-18 RX ORDER — ACETAMINOPHEN 500 MG
1000 TABLET ORAL
COMMUNITY
Start: 2021-02-21

## 2021-08-18 RX ORDER — PROCHLORPERAZINE MALEATE 5 MG
5 TABLET ORAL EVERY 6 HOURS PRN
Status: CANCELLED | OUTPATIENT
Start: 2021-08-18

## 2021-08-18 RX ORDER — HYDROCHLOROTHIAZIDE 25 MG/1
25 TABLET ORAL
COMMUNITY
Start: 2021-04-30

## 2021-08-18 RX ORDER — ONDANSETRON 2 MG/ML
4 INJECTION INTRAMUSCULAR; INTRAVENOUS EVERY 6 HOURS PRN
Status: CANCELLED | OUTPATIENT
Start: 2021-08-18

## 2021-08-18 RX ORDER — FLUMAZENIL 0.1 MG/ML
0.2 INJECTION, SOLUTION INTRAVENOUS
Status: CANCELLED | OUTPATIENT
Start: 2021-08-18 | End: 2021-08-18

## 2021-08-18 RX ORDER — ONDANSETRON 2 MG/ML
4 INJECTION INTRAMUSCULAR; INTRAVENOUS
Status: DISCONTINUED | OUTPATIENT
Start: 2021-08-18 | End: 2021-08-18 | Stop reason: HOSPADM

## 2021-08-18 RX ORDER — MONTELUKAST SODIUM 10 MG/1
TABLET ORAL
COMMUNITY
Start: 2021-08-12

## 2021-08-18 RX ADMIN — FENTANYL CITRATE 50 MCG: 50 INJECTION, SOLUTION INTRAMUSCULAR; INTRAVENOUS at 09:02

## 2021-08-18 RX ADMIN — FENTANYL CITRATE 100 MCG: 50 INJECTION, SOLUTION INTRAMUSCULAR; INTRAVENOUS at 08:44

## 2021-08-18 RX ADMIN — MIDAZOLAM 2 MG: 1 INJECTION INTRAMUSCULAR; INTRAVENOUS at 08:45

## 2021-08-18 RX ADMIN — MIDAZOLAM 1 MG: 1 INJECTION INTRAMUSCULAR; INTRAVENOUS at 08:53

## 2021-08-18 RX ADMIN — FENTANYL CITRATE 50 MCG: 50 INJECTION, SOLUTION INTRAMUSCULAR; INTRAVENOUS at 08:57

## 2021-08-18 ASSESSMENT — MIFFLIN-ST. JEOR: SCORE: 1204.01

## 2021-08-18 NOTE — BRIEF OP NOTE
[facility]    Brief Operative Note    Pre-operative diagnosis: Screen for colon cancer [Z12.11]  Post-operative diagnosis diverticulosis    Procedure: Procedure(s):  COLONOSCOPY, WITH POLYPECTOMY AND BIOPSY  Surgeon: Surgeon(s) and Role:     * Soraya Taylor MD - Primary  Anesthesia: Conscious Sedation   Estimated blood loss: Minimal  Drains: None  Specimens:   ID Type Source Tests Collected by Time Destination   1 : random biopsies Biopsy Large Intestine, Colon SURGICAL PATHOLOGY EXAM Soraya Taylor MD 8/18/2021  9:08 AM      Findings:   See Provation procedure note in Epic    Complications: None   .  Implants: * No implants in log *

## 2021-08-18 NOTE — H&P
Pre-Endoscopy History and Physical     Sharon Torrez MRN# [id number]   YOB: 1950 Age: 71 year old     Date of Procedure: 8/18/2021  Primary care provider: Sujata Earl  Type of Endoscopy: colonoscopy  Reason for Procedure: screening, history of polyps  Type of Anesthesia Anticipated: moderate sedation    HPI:    Sharon is a 71 year old female who will be undergoing the above procedure.  Patient had a tubulovillous adenoma removed during last colonoscopy. Patient reports a history of almost daily diarrhea for years which improved after long-acting steroid injection for asthma and sinus issues.       She denies a personal or family history of anesthesia complications or bleeding disorders.   Prior to Admission medications    Medication Sig Start Date End Date Taking? Authorizing Provider   acetaminophen (TYLENOL) 500 MG tablet Take 1,000 mg by mouth 2/21/21  Yes Reported, Patient   albuterol (ALBUTEROL) 108 (90 BASE) MCG/ACT inhaler Inhale 2 puffs into the lungs 10/20/15  Yes Reported, Patient   azelastine (ASTELIN) 0.1 % nasal spray Spray 1 spray into both nostrils 2 times daily   Yes Reported, Patient   calcium carbonate (RA CALCIUM) 500 MG tablet  10/22/08  Yes Reported, Patient   Cholecalciferol (D 1000) 1000 UNITS CAPS Take 3 capsules by mouth 10/28/10  Yes Reported, Patient   clonazePAM (KLONOPIN) 0.5 MG tablet Take 0.5 mg by mouth 3/9/17  Yes Reported, Patient   Dichloracetic Acid, [id number], (BICHLORACETIC ACID ALY EX) bichlor daily 6/16/15  Yes Reported, Patient   Fexofenadine HCl (ALLEGRA PO)    Yes Reported, Patient   FLUTICASONE PROPIONATE, NASAL, NA    Yes Reported, Patient   Glucosamine-Chondroitin-MSM (RA GLUCOSAMINE-CHONDROIT-MSM) 500-400-300 MG TABS  6/20/08  Yes Reported, Patient   hydrochlorothiazide (HYDRODIURIL) 25 MG tablet Take 25 mg by mouth 4/30/21  Yes Reported, Patient   hydrOXYzine (ATARAX) 25 MG tablet Take 25 mg by mouth 4/27/17  Yes Reported, Patient   montelukast  (SINGULAIR) 10 MG tablet  8/12/21  Yes Reported, Patient   Multiple Vitamin (MULTI-VITAMINS) TABS  10/22/08  Yes Reported, Patient   Omega-3 Fatty Acids (OMEGA 3 500) 500 MG CAPS  10/23/09  Yes Reported, Patient   omeprazole (PRILOSEC) 10 MG DR capsule  8/16/21  Yes Reported, Patient   pramipexole (MIRAPEX) 0.125 MG tablet Take 0.125 mg by mouth 3 times daily   Yes Reported, Patient   spironolactone (ALDACTONE) 50 MG tablet Take 50 mg by mouth 3/9/17  Yes Reported, Patient   SUMAtriptan (IMITREX) 100 MG tablet TK 1 T PO AT ONSET OF HEADACHE UTD 6/30/16  Yes Reported, Patient   topiramate (TOPAMAX) 100 MG tablet  6/20/08  Yes Reported, Patient   amoxicillin (AMOXIL) 500 MG capsule Take 2g hour before dental visit. 12/11/17   Reported, Patient   budesonide-formoterol (SYMBICORT) 160-4.5 MCG/ACT Inhaler Inhale 2 puffs into the lungs 2 times daily    Reported, Patient   clindamycin-benzoyl peroxide (BENZACLIN) gel Apply to skin daily hs 10/20/15   Reported, Patient   cyclobenzaprine (FLEXERIL) 10 MG tablet  2/2/15   Reported, Patient   DHA-EPA-VITAMIN E PO  10/23/09   Reported, Patient   metroNIDAZOLE (METROGEL) 1 % gel Apply topically daily    Reported, Patient   Mometasone Furoate 100 MCG/ACT AERO Inhale 100 mcg into the lungs 6/21/17   Reported, Patient   RESVERATROL PO Resveratrol liquid daily 12/21/12   Reported, Patient   RISEdronate (ACTONEL) 35 MG tablet Take 35 mg by mouth 7/13/17   Reported, Patient   silver sulfADIAZINE (SILVADENE) 1 % cream  3/9/17   Reported, Patient   Triamcinolone Acetonide (KENALOG IJ)     Reported, Patient       Allergies   Allergen Reactions     Celecoxib Other (See Comments)     Other reaction(s): Edema     Citalopram Diarrhea     Naproxen Other (See Comments)     Other reaction(s): Edema     Nsaids Other (See Comments)     Other reaction(s): GI Upset  Gastric ulcer  Gastric ulcer     Ragweeds      Runny nose     Seasonal Allergies Other (See Comments)     Venlafaxine Other (See  Comments)     Other reaction(s): Intolerance-Can't Take  Foggy headed  Foggy headed     Vicodin [Hydrocodone-Acetaminophen] Hives     Bupropion Anxiety        No current facility-administered medications for this encounter.       Patient Active Problem List   Diagnosis     Hallux rigidus     Acute gastric ulcer     Anxiety state     Benign neoplasm of cerebral meninges (H)     Intramural leiomyoma of uterus     Herniated lumbar intervertebral disc     Encounter for screening for malignant neoplasm of colon     Encounter for screening mammogram for malignant neoplasm of breast     Osteoporosis     Advance directive discussed with patient     Calculus of kidney     Complete tear of left rotator cuff     Family history of cardiovascular disease     Insomnia     Migraine without status migrainosus, not intractable     Tear of supraspinatus tendon     Abnormality of thoracic aorta     Acute deep vein thrombosis (DVT) of femoral vein of right lower extremity (H)     Analgesic rebound headache     Chronic tension headache     Diverticulosis     Elevated LDL cholesterol level     Essential hypertension     High risk for fracture due to osteoporosis by DEXA scan     History of gastric ulcer     Irritable bowel syndrome with diarrhea     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)     Mild intermittent asthma     Other screening mammogram     Controlled substance agreement signed     Patent foramen ovale     Primary osteoarthritis of both wrists     Problems of adjustment to life-cycle transitions     S/P rotator cuff surgery     Stage 3 chronic kidney disease     Nontraumatic complete tear of right rotator cuff     Major depressive disorder with single episode     Major depressive disorder, recurrent episode, moderate (H)        Past Medical History:   Diagnosis Date     Arthritis      Asthma      Atypical chest pain      Hypercholesteremia      IBD (inflammatory bowel disease)      Migraines      Osteopenia   "    Renal disease     history kidney stones     Restless leg         Past Surgical History:   Procedure Laterality Date     ARTHRODESIS FOOT  1/4/2013    Procedure: ARTHRODESIS FOOT;  RIGHT FIRST METATARSAL PHALANGEAL JOINT ARTHRODESIS;  Surgeon: Rupert Jacobo MD;  Location: [facility] OR     GYN SURGERY      csection     KNEE SURGERY       SHOULDER SURGERY         Social History     Tobacco Use     Smoking status: Never Smoker     Smokeless tobacco: Never Used   Substance Use Topics     Alcohol use: Yes     Comment: occasional        Family History   Problem Relation Age of Onset     Osteoporosis Mother      Hyperlipidemia Mother      Colon Polyps Brother        REVIEW OF SYSTEMS:     5 point ROS negative except as noted above in HPI, including Gen., Resp., CV, GI &  system review. Right shoulder and left knee pain.        PHYSICAL EXAM:   /79   Resp 16   Ht 1.575 m (5' 2\")   Wt 72.6 kg (160 lb)   SpO2 95%   BMI 29.26 kg/m   Estimated body mass index is 29.26 kg/m  as calculated from the following:    Height as of this encounter: 1.575 m (5' 2\").    Weight as of this encounter: 72.6 kg (160 lb).   GENERAL APPEARANCE: healthy  MENTAL STATUS: alert  AIRWAY EXAM: Mallampatti Class II (visualization of the soft palate, fauces, and uvula)  RESP: lungs clear to auscultation - no rales, rhonchi or wheezes  CV: regular rates and rhythm      DIAGNOSTICS:    Not indicated      IMPRESSION   ASA Class 3 - Severe systemic disease, but not incapacitating        PLAN:     A history and physical has been performed. The patient's medications and allergies have been reviewed. The risks and benefits of the procedure and the sedation options and risks were discussed with the patient.  All questions were answered and informed consent was obtained.           The above has been forwarded to the consulting provider.      Signed Electronically by: Soraya Taylor MD  August 18, 2021          "

## 2021-08-19 LAB
PATH REPORT.COMMENTS IMP SPEC: NORMAL
PATH REPORT.FINAL DX SPEC: NORMAL
PATH REPORT.GROSS SPEC: NORMAL
PATH REPORT.MICROSCOPIC SPEC OTHER STN: NORMAL
PATH REPORT.RELEVANT HX SPEC: NORMAL
PHOTO IMAGE: NORMAL

## 2021-08-19 PROCEDURE — 88305 TISSUE EXAM BY PATHOLOGIST: CPT | Mod: 26

## 2024-01-16 ENCOUNTER — OFFICE VISIT (OUTPATIENT)
Dept: VASCULAR SURGERY | Facility: CLINIC | Age: 74
End: 2024-01-16
Payer: COMMERCIAL

## 2024-01-16 DIAGNOSIS — I83.891 HEMORRHAGE OF VARICOSE VEINS OF RIGHT LOWER EXTREMITY: Primary | ICD-10-CM

## 2024-01-16 DIAGNOSIS — I83.891 VARICOSE VEINS OF RIGHT LOWER EXTREMITY WITH OTHER COMPLICATIONS: ICD-10-CM

## 2024-01-16 PROCEDURE — 99203 OFFICE O/P NEW LOW 30 MIN: CPT | Performed by: SURGERY

## 2024-01-16 RX ORDER — ATORVASTATIN CALCIUM 20 MG/1
TABLET, FILM COATED ORAL
COMMUNITY
Start: 2023-09-12

## 2024-01-16 RX ORDER — ESTRADIOL 0.1 MG/G
CREAM VAGINAL
COMMUNITY
Start: 2022-05-19

## 2024-01-16 RX ORDER — GABAPENTIN 100 MG/1
300 CAPSULE ORAL AT BEDTIME
COMMUNITY
Start: 2023-12-15

## 2024-01-16 NOTE — LETTER
1/16/2024         RE: Sharon Torrez  [address] [facility] [place] [address]        Dear Colleague,    Thank you for referring your patient, Sharon Torrez, to the [facility] [place]. Please see a copy of my visit note below.      [facility] CONSULTATION    HPI:    Sharon Torrez is a pleasant 73 year old female referred by [facility] for evaluation of an area of hemorrhage on her anterior lateral right leg.  Patient states that she was standing in her bathroom after showering and looked down and saw a puddle of blood on the floor and eventually looked at her leg to see that the blood was spurting from a vein on the anterolateral right leg.  She was able to walk into the shower and hold pressure on the leg until the stop bleeding.  Unfortunately, few days later, she was in the shower and the vein began bleeding again.  Once again she held pressure and kept a pressure dressing over this for several days.  These were her first 2 episodes of hemorrhage.    She has noted veins on her legs for about 2 years.  She denies significant pain from her veins but has seen the vein, especially on the anterior right leg, grow larger and extend down to the dorsum of her foot.  She has also had proliferation of telangiectasias from the thighs to the feet.    The patient reports a history of a right lower extremity deep vein thrombosis following a right shoulder surgery several years ago.  She was treated for an appropriate period of time with anticoagulation, then discontinued it and has had no recurrent deep vein thrombosis.    Family history is significant for varicose veins in her mother.  She is not aware of any blood clots in family members.    PAST MEDICAL HISTORY:   Past Medical History:   Diagnosis Date     Arthritis      Asthma      Atypical chest pain      Hypercholesteremia      IBD (inflammatory bowel disease)      Migraines      Osteopenia      Renal disease     history kidney stones      Restless leg        PAST SURGICAL HISTORY:   Past Surgical History:   Procedure Laterality Date     ARTHRODESIS FOOT  1/4/2013    Procedure: ARTHRODESIS FOOT;  RIGHT FIRST METATARSAL PHALANGEAL JOINT ARTHRODESIS;  Surgeon: Rupert Jacobo MD;  Location:  OR     COLONOSCOPY N/A 8/18/2021    Procedure: COLONOSCOPY, WITH POLYPECTOMY AND BIOPSY;  Surgeon: Soraya Taylor MD;  Location:  GI     GYN SURGERY      csection     KNEE SURGERY       SHOULDER SURGERY         FAMILY HISTORY:   Family History   Problem Relation Age of Onset     Osteoporosis Mother      Hyperlipidemia Mother      Colon Polyps Brother        SOCIAL HISTORY:   Social History     Tobacco Use     Smoking status: Never     Smokeless tobacco: Never   Substance Use Topics     Alcohol use: Yes     Comment: occasional        REVIEW OF SYSTEMS: Review Of Systems  Skin: Rash, pruritus, finger/toenail problems  Eyes: negative  Ears/Nose/Throat: Sore throat, hoarseness, dizziness  Respiratory: Dry cough  Cardiovascular: Racing heart  Gastrointestinal: Nausea, diarrhea, heartburn, difficulty swallowing solids  Genitourinary: negative  Musculoskeletal: Arthritis, back pain, neck pain, foot pain, leg pain, leg swelling  Neurologic: Headaches, weakness  Psychiatric: negative  Hematologic/Lymphatic/Immunologic: Bloody nose, bleeding problems, bleeding veins  Endocrine: Postmenopausal      Vital signs:  There were no vitals taken for this visit.    Current Outpatient Medications   Medication Sig Dispense Refill     acetaminophen (TYLENOL) 500 MG tablet Take 1,000 mg by mouth       albuterol (ALBUTEROL) 108 (90 BASE) MCG/ACT inhaler Inhale 2 puffs into the lungs       amoxicillin (AMOXIL) 500 MG capsule Take 2g hour before dental visit.       azelastine (ASTELIN) 0.1 % nasal spray Spray 1 spray into both nostrils 2 times daily       budesonide-formoterol (SYMBICORT) 160-4.5 MCG/ACT Inhaler Inhale 2 puffs into the lungs 2 times daily       calcium  carbonate (RA CALCIUM) 500 MG tablet        Cholecalciferol (D 1000) 1000 UNITS CAPS Take 3 capsules by mouth       clindamycin-benzoyl peroxide (BENZACLIN) gel Apply to skin daily hs       clonazePAM (KLONOPIN) 0.5 MG tablet Take 0.5 mg by mouth       cyclobenzaprine (FLEXERIL) 10 MG tablet        DHA-EPA-VITAMIN E PO        Dichloracetic Acid, [id number], (BICHLORACETIC ACID KAHLENBERG EX) bichlor daily       Fexofenadine HCl (ALLEGRA PO)        FLUTICASONE PROPIONATE, NASAL, NA        Glucosamine-Chondroitin-MSM (RA GLUCOSAMINE-CHONDROIT-MSM) 500-400-300 MG TABS        hydrochlorothiazide (HYDRODIURIL) 25 MG tablet Take 25 mg by mouth       hydrOXYzine (ATARAX) 25 MG tablet Take 25 mg by mouth       metroNIDAZOLE (METROGEL) 1 % gel Apply topically daily       Mometasone Furoate 100 MCG/ACT AERO Inhale 100 mcg into the lungs       montelukast (SINGULAIR) 10 MG tablet        Multiple Vitamin (MULTI-VITAMINS) TABS        Omega-3 Fatty Acids (OMEGA 3 500) 500 MG CAPS        omeprazole (PRILOSEC) 10 MG DR capsule        pramipexole (MIRAPEX) 0.125 MG tablet Take 0.125 mg by mouth 3 times daily       RESVERATROL PO Resveratrol liquid daily       RISEdronate (ACTONEL) 35 MG tablet Take 35 mg by mouth       silver sulfADIAZINE (SILVADENE) 1 % cream        spironolactone (ALDACTONE) 50 MG tablet Take 50 mg by mouth       SUMAtriptan (IMITREX) 100 MG tablet TK 1 T PO AT ONSET OF HEADACHE UTD       topiramate (TOPAMAX) 100 MG tablet        Triamcinolone Acetonide (KENALOG IJ)          PHYSICAL EXAM:  General: Pleasant, NAD.   HEENT: Normocephalic, atraumatic, external ears and nose normal.   Respiratory: Normal respiratory effort.   Cardiovascular: Pulse is regular.   Musculoskeletal: Gait and station normal.  The joints of her fingers and toes without deformity.  There is no cyanosis of her nailbeds.   EXTREMITIES: Extremity: 3-4 mm varicose vein coursing down the right pretibial area from the proximal third of the  anterolateral right leg.  There are smaller, punctate veins in the anterolateral proximal right leg with a scab overlying 1 of these veins, the area for which she has bled on 2 occasions in the last week.  No significant right lower extremity edema.  Scattered telangiectasias and reticular veins onto the dorsum of the right foot.    Left lower extremity: Small varicose veins over the posteromedial left calf.  Scattered telangiectasias over the thigh and leg extending onto the ankle.  PULSES: R/L (3=normal pulse, 0=no palpable pulse) dorsalis pedis: 3/3; posterior tibial: 3/3.      Neurologic: Grossly normal  Psychiatric: Mood, affect, judgment and insight are normal    ASSESSMENT:  Right anterolateral leg varicose veins with 2 episodes of hemorrhage in the last week.  She states that since the hemorrhage, the vein has grown smaller but has a scab over it.  These were the first 2 episodes of hemorrhage she has had.    We discussed lower extremity vein anatomy and the pathophysiology of venous insufficiency utilizing a leg vein drawing.  She will undoubtedly bleed again from this vein if it is not treated.  The varicose vein deep to the area of hemorrhage will need to be treated with ultrasound guidance for the initial session.  A second session of direct vision sclerotherapy may be necessary.    Details of sclerotherapy including risks of allergic reaction, deep vein thrombosis, ulceration, hyperpigmentation and trapped blood were discussed.  The patient voiced understanding of our discussion and her questions were answered.    She may be interested in having treatment of bilateral lower extremity spider telangiectasias for cosmetic purposes in the future with full understanding that this would not be covered by insurance and would be her responsibility.  This would have to wait until after she has her right total knee replacement surgery.    PLAN:  Medically necessary sclerotherapy bleeding right anterolateral leg  vein     Arian Salazar MD, FACS    Dictated using Dragon voice recognition software which may result in transcription errors        VEIN CLINIC LEG DRAWING:                  2024    Vein Procedure Recommendation    Spoke with patient in clinic.    Dr. Salazar has recommended patient to have the following vein procedure(s):     1. Right leg Ultrasound Guided Sclerotherapy (medically necessary) 2 sessions    Patient Pre-op Questions:  Preferred Pharmacy: ***  Anticoagulant/ASA: {YES-NO  Default Yes:4444}  Artificial Joint or Heart Valve:  Yes, left INOCENCIO  Open ulcer:  No  Sedation nausea: N/A    Patient is recommended to wear {Compression Hose Style:885716} compression hose following {His/her (Lower case):837110} procedure. Discussed compression hose. Explained to patient if insurance doesn't cover the compression hose there are a couple different options to getting them and to call the clinic to let us know if they need help.    {ptdelivery:006673} written procedure instructions to review on {His/her (Lower case):133397} own (see After Visit Summary).    Next steps:    Insurance Submission  Informed patient this process could take up to 14 business days, but once approved, the patient will be contacted by our surgery scheduler to schedule the above procedure. Gave patient our surgery scheduler's information.    {Closin}    Crystal Quezada RN  [facility]  [facility]       Again, thank you for allowing me to participate in the care of your patient.        Sincerely,        Arian Salazar MD

## 2024-01-16 NOTE — NURSING NOTE
Patient Reported symptoms:    Bilateral leg   Heaviness A little of the time   Achiness None of the time (pain in the knees)  Swelling A little of the time   Throbbing None of the time (pain in the knees)  Itching Some of the time   Appearance Moderately noticeable   Impact on work/activities Symptoms but full able to participate

## 2024-01-16 NOTE — PROGRESS NOTES
January 16, 2024    Vein Procedure Recommendation    Spoke with patient in clinic.    Dr. Salazar has recommended patient to have the following vein procedure(s):     1. Right leg Ultrasound Guided Sclerotherapy (medically necessary) 2 sessions    Patient Pre-op Questions:  Preferred Pharmacy: [facility]   Anticoagulant/ASA: No  Artificial Joint or Heart Valve:  Yes, left INOCENCIO  Open ulcer:  No  Sedation nausea: N/A    Patient is recommended to wear Knee High compression hose following her procedure. Discussed compression hose. Explained to patient if insurance doesn't cover the compression hose there are a couple different options to getting them and to call the clinic to let us know if they need help.    Handed patient written procedure instructions to review on her own (see After Visit Summary).    Next steps:    Insurance Submission  Informed patient this process could take up to 14 business days, but once approved, the patient will be contacted by our surgery scheduler to schedule the above procedure. Gave patient our surgery scheduler's information.    Patient is in agreement with all of the above and has no further questions at this time.    Crystal Quezada RN  [facility]  Vein Clinic

## 2024-01-16 NOTE — PATIENT INSTRUCTIONS
Sclerotherapy: Pre-Treatment Instructions    Recommended Sessions:  __2__ treatment sessions    Ultrasound Guided Sclerotherapy - Medically Necessary    Time Required per Treatment Session - About 45 minutes  Please come in 15 minutes before your scheduled appointment.  30 min.  Sclerotherapy treatments last approximately 30 minutes.  5 min.    A staff member will wipe your legs off with warm water and dry them with a wash cloth.                 Then you can put your compression hose on, get dressed and check out.  10 min.  After your treatment, you will be asked to walk around for 10 minutes before you get in your car.    Medications  Five days before your appointment, discontinue aspirin (Bufferin, Anacin, etc.) and Ibuprofen (Motrin, Advil, Aleve, etc.) to reduce bruising. Resume these medications the day following the treatment.    Leg Preparation  Do not shave your legs or apply any oil, lotion or powder the night before or the day of your treatment.    Clothing  Shorts:  Bring a pair of loose, comfortable shorts to wear during your treatment (or you can choose to wear ours). Shoes: Bring comfortable shoes to accommodate the compression hose after your treatment. Do not wear flip-flops or thong-style sandals unless you have open-toe compression hose.    Photographs  Photos will be taken before each treatment. This helps monitor your progress.    Injections  The physician will inject your veins with the sclerotherapy solution chosen to meet your specific needs.    Compression Hose  Please bring your compression hose if you have them. They may also be reserved for you at our clinic. Compression hose must be worn immediately after each sclerotherapy treatment.  The hose must be compression level 20-30, and they must be worn for 24 hours straight after your treatment.  If you have never worn compression hose before, a staff member will teach you how to put them on.             You cannot have a treatment  without compression hose.               They are critical to the success of your treatment!    You may purchase your compression hose from us. We will measure you and have the hose available when you come for your treatment.    Cancellation and Rescheduling  If you need to cancel or reschedule your sclerotherapy treatment, please give our office at least 24 hour notice.    Sclerotherapy: Basic Information    What is sclerotherapy?  Sclerotherapy is a treatment for  spider  veins.  Spider  veins are small veins just under your skin that can look red, blue or purple. Most  spider  veins are only a cosmetic problem.  Spider  veins are not useful and treating them will not affect your circulation.    How does sclerotherapy work?  1.  Injections: A very small needle is used to inject a solution into the  spider  veins. The solution irritates the cells that line the vein walls. This causes the veins to collapse. The vein walls to stick together and they can no longer carry blood. Different solutions are used based on the size of the veins.  2.  Compression:  The spider veins are kept collapsed by wearing compression stockings. Your body will break down and absorb the treated veins. You wear the compression hose for 24 hours after the treatment and then for 4 more days during your waking hours only.    How does the body heal after sclerotherapy?  The process is similar to how your body heals after a bad bruise. It takes 4-6 weeks or more for the healing to be complete. When the healing is complete, the vein is no longer visible. It may take more than one treatment.    How do I get the best results?  It is important to follow the post-sclerotherapy instructions. The best results require time and patience. The injection sites will continue to heal and fade for months after a treatment. Please discuss your expectations with your doctor to keep them realistic. Your doctor will do everything possible to meet or exceed your  expectations.    How many treatments are needed?  After your initial exam, your doctor will give you an estimate of the number of treatments that may be required. It depends upon the size, type, and quantity of your  spider  veins and on the doctor's assessment, your history and expectations. You may end up needing fewer or more treatments.    How soon can I have another treatment?  Additional treatments are scheduled every 4-6 weeks to allow time for the body to respond to the previous treatment.    Common Side Effects:  Itching  The areas that were injected may itch. This is usually mild and lasts less than a day. Do not use lotions or creams on your legs until the injection sites have healed over.    Pain  It is common to have some tenderness at the injection sites. Injection of the solution can be uncomfortable, but is usually well tolerated by most patients. The tenderness is temporary, lasting 24 hours at most. Tylenol or Ibuprofen can be used, if needed, following the product directions.    Bruising  This may occur at the injections sites. Bruising may be minimized by avoiding Aspirin and Ibuprofen products for five days before each treatment session.    Hyperpigmentation  A light brown discoloration of the skin may develop along the veins in the areas injected. Approximately 20-30% of patients treated note the discoloration (which is lighter and less obvious than the veins that are being treated). The hyperpigmentation usually fades in a couple of weeks, but may take several months to a year to totally resolve. There is a 1% chance of hyperpigmentation continuing after one year.    Trapped blood  A small amount of blood may become trapped and hardened in the veins. This may feel like a knot or cord and it may look dark blue or bruised. This is a common occurrence. You may need to return before your next treatment so this area can be drained to remove the trapped blood. This will reduce the hyperpigmentation  that can occur. The chance of this occurring can be decreased with proper use of compression hose after your treatment.    Matting  Matting is the formation of new, fine  spider  veins in the area injected.  It occurs in approximately 10% of patients injected. The exact reason for this is unknown. If untreated, the matting usually resolves in 3 to 12 months, but very rarely, it can be permanent. If the matting does not fade, it can be re-injected.    Rare Side Effects:  Ulceration at injection sites  Very rarely, a small ulcer will occur at the site where a vein is injected. An ulcer can take 4 to 6 weeks to completely heal. A small scar may result.    Allergic reactions  There is a very rare incidence of an allergic reaction to the solution injected. You will be observed for such reaction and will be treated appropriately should it occur. Please inform us of any allergy history.    Pulmonary embolus/Deep vein thrombosis  This is a blood clot which moves to the lungs/a blood clot in the deep vein system. There is an extraordinarily low incidence of this complication.      SCLEROTHERAPY AFTER CARE  Immediately:  After treatment, walk for 10-15 minutes before getting in your car.  If your trip home is more than 1 hour, stop and walk around for 5-10 minutes. Avoid sitting or standing for extended periods.   First 24 hours: Wear your compression continuously, even while in bed. After the 24 hours, you may shower if you want to. Put your hose back on, unless you are going to bed. You should NOT wear compression to bed after the first 24 hours. You may fly the next day, but wear your compression.   For 5 days: Wear the compression hose for waking hours only. You may continue to wear them longer than 5 days if you prefer.   For days 5-7: Walking is encouraged, as it promotes efficient circulation in your veins. You may do activities that raise your heart rate, but do NOT run, jog, do high impact aerobics, or weight  lifting. After 7 days, no activity restrictions.  Shaving: Wait a few days to shave or apply lotion.   Bathing: Do NOT take hot baths or sit in a hot tub for 7-10 days.    For 1 year: Wear SPF 30 sunscreen on your legs when in the sun. This is very important! It helps prevent darkening of the skin at the injection sites.   Medications: You may resume your usual medications, including aspirin or ibuprofen.    Common Things to Expect       Compression must be worn for the first 24 hours and then during the day for 5-7 days.    If larger veins are treated with ultrasound-guided sclerotherapy, you will have redness, firmness, tenderness, and swelling.  This firmness and tenderness may take 3-6 months to resolve. Ibuprofen and compression hose will aid in this process.    You will have bruising that can last up to 3 weeks. Most fading of the veins will occur between 3 and 6 weeks after treatment.    You may notice brown discoloration (hyperpigmentation) at the treatment site.  This should fade with time, but will take 3 months to 1 year to fully heal.     Some treated veins may look darker because of trapped blood within the vein. This trapped blood can be removed at a minimum of 1 month following treatment. Larger veins are more likely to develop trapped blood.    It is very important for you to use at least SPF 30 sunscreen in order to help prevent the discoloration of your skin.    Migraines rarely occur following sclerotherapy, but are more likely in patients with a history of migraines.  Treat as you would any other migraine.

## 2024-01-16 NOTE — PROGRESS NOTES
[facility] CONSULTATION    HPI:    Sharon Torrez is a pleasant 73 year old female referred by [facility] for evaluation of an area of hemorrhage on her anterior lateral right leg.  Patient states that she was standing in her bathroom after showering and looked down and saw a puddle of blood on the floor and eventually looked at her leg to see that the blood was spurting from a vein on the anterolateral right leg.  She was able to walk into the shower and hold pressure on the leg until the stop bleeding.  Unfortunately, few days later, she was in the shower and the vein began bleeding again.  Once again she held pressure and kept a pressure dressing over this for several days.  These were her first 2 episodes of hemorrhage.    She has noted veins on her legs for about 2 years.  She denies significant pain from her veins but has seen the vein, especially on the anterior right leg, grow larger and extend down to the dorsum of her foot.  She has also had proliferation of telangiectasias from the thighs to the feet.    The patient reports a history of a right lower extremity deep vein thrombosis following a right shoulder surgery several years ago.  She was treated for an appropriate period of time with anticoagulation, then discontinued it and has had no recurrent deep vein thrombosis.    Family history is significant for varicose veins in her mother.  She is not aware of any blood clots in family members.    PAST MEDICAL HISTORY:   Past Medical History:   Diagnosis Date    Arthritis     Asthma     Atypical chest pain     Hypercholesteremia     IBD (inflammatory bowel disease)     Migraines     Osteopenia     Renal disease     history kidney stones    Restless leg        PAST SURGICAL HISTORY:   Past Surgical History:   Procedure Laterality Date    ARTHRODESIS FOOT  1/4/2013    Procedure: ARTHRODESIS FOOT;  RIGHT FIRST METATARSAL PHALANGEAL JOINT ARTHRODESIS;  Surgeon: Rupert Jacobo MD;  Location:  OR     COLONOSCOPY N/A 8/18/2021    Procedure: COLONOSCOPY, WITH POLYPECTOMY AND BIOPSY;  Surgeon: Soraya Taylor MD;  Location:  GI    GYN SURGERY      csection    KNEE SURGERY      SHOULDER SURGERY         FAMILY HISTORY:   Family History   Problem Relation Age of Onset    Osteoporosis Mother     Hyperlipidemia Mother     Colon Polyps Brother        SOCIAL HISTORY:   Social History     Tobacco Use    Smoking status: Never    Smokeless tobacco: Never   Substance Use Topics    Alcohol use: Yes     Comment: occasional        REVIEW OF SYSTEMS: Review Of Systems  Skin: Rash, pruritus, finger/toenail problems  Eyes: negative  Ears/Nose/Throat: Sore throat, hoarseness, dizziness  Respiratory: Dry cough  Cardiovascular: Racing heart  Gastrointestinal: Nausea, diarrhea, heartburn, difficulty swallowing solids  Genitourinary: negative  Musculoskeletal: Arthritis, back pain, neck pain, foot pain, leg pain, leg swelling  Neurologic: Headaches, weakness  Psychiatric: negative  Hematologic/Lymphatic/Immunologic: Bloody nose, bleeding problems, bleeding veins  Endocrine: Postmenopausal      Vital signs:  There were no vitals taken for this visit.    Current Outpatient Medications   Medication Sig Dispense Refill    acetaminophen (TYLENOL) 500 MG tablet Take 1,000 mg by mouth      albuterol (ALBUTEROL) 108 (90 BASE) MCG/ACT inhaler Inhale 2 puffs into the lungs      amoxicillin (AMOXIL) 500 MG capsule Take 2g hour before dental visit.      azelastine (ASTELIN) 0.1 % nasal spray Spray 1 spray into both nostrils 2 times daily      budesonide-formoterol (SYMBICORT) 160-4.5 MCG/ACT Inhaler Inhale 2 puffs into the lungs 2 times daily      calcium carbonate (RA CALCIUM) 500 MG tablet       Cholecalciferol (D 1000) 1000 UNITS CAPS Take 3 capsules by mouth      clindamycin-benzoyl peroxide (BENZACLIN) gel Apply to skin daily hs      clonazePAM (KLONOPIN) 0.5 MG tablet Take 0.5 mg by mouth      cyclobenzaprine (FLEXERIL) 10 MG  tablet       DHA-EPA-VITAMIN E PO       Dichloracetic Acid, [id number], (BICHLORACETIC ACID KAHLENBERG EX) bichlor daily      Fexofenadine HCl (ALLEGRA PO)       FLUTICASONE PROPIONATE, NASAL, NA       Glucosamine-Chondroitin-MSM (RA GLUCOSAMINE-CHONDROIT-MSM) 500-400-300 MG TABS       hydrochlorothiazide (HYDRODIURIL) 25 MG tablet Take 25 mg by mouth      hydrOXYzine (ATARAX) 25 MG tablet Take 25 mg by mouth      metroNIDAZOLE (METROGEL) 1 % gel Apply topically daily      Mometasone Furoate 100 MCG/ACT AERO Inhale 100 mcg into the lungs      montelukast (SINGULAIR) 10 MG tablet       Multiple Vitamin (MULTI-VITAMINS) TABS       Omega-3 Fatty Acids (OMEGA 3 500) 500 MG CAPS       omeprazole (PRILOSEC) 10 MG DR capsule       pramipexole (MIRAPEX) 0.125 MG tablet Take 0.125 mg by mouth 3 times daily      RESVERATROL PO Resveratrol liquid daily      RISEdronate (ACTONEL) 35 MG tablet Take 35 mg by mouth      silver sulfADIAZINE (SILVADENE) 1 % cream       spironolactone (ALDACTONE) 50 MG tablet Take 50 mg by mouth      SUMAtriptan (IMITREX) 100 MG tablet TK 1 T PO AT ONSET OF HEADACHE UTD      topiramate (TOPAMAX) 100 MG tablet       Triamcinolone Acetonide (KENALOG IJ)          PHYSICAL EXAM:  General: Pleasant, NAD.   HEENT: Normocephalic, atraumatic, external ears and nose normal.   Respiratory: Normal respiratory effort.   Cardiovascular: Pulse is regular.   Musculoskeletal: Gait and station normal.  The joints of her fingers and toes without deformity.  There is no cyanosis of her nailbeds.   EXTREMITIES: Extremity: 3-4 mm varicose vein coursing down the right pretibial area from the proximal third of the anterolateral right leg.  There are smaller, punctate veins in the anterolateral proximal right leg with a scab overlying 1 of these veins, the area for which she has bled on 2 occasions in the last week.  No significant right lower extremity edema.  Scattered telangiectasias and reticular veins onto the dorsum  of the right foot.    Left lower extremity: Small varicose veins over the posteromedial left calf.  Scattered telangiectasias over the thigh and leg extending onto the ankle.  PULSES: R/L (3=normal pulse, 0=no palpable pulse) dorsalis pedis: 3/3; posterior tibial: 3/3.      Neurologic: Grossly normal  Psychiatric: Mood, affect, judgment and insight are normal    ASSESSMENT:  Right anterolateral leg varicose veins with 2 episodes of hemorrhage in the last week.  She states that since the hemorrhage, the vein has grown smaller but has a scab over it.  These were the first 2 episodes of hemorrhage she has had.    We discussed lower extremity vein anatomy and the pathophysiology of venous insufficiency utilizing a leg vein drawing.  She will undoubtedly bleed again from this vein if it is not treated.  The varicose vein deep to the area of hemorrhage will need to be treated with ultrasound guidance for the initial session.  A second session of direct vision sclerotherapy may be necessary.    Details of sclerotherapy including risks of allergic reaction, deep vein thrombosis, ulceration, hyperpigmentation and trapped blood were discussed.  The patient voiced understanding of our discussion and her questions were answered.    She may be interested in having treatment of bilateral lower extremity spider telangiectasias for cosmetic purposes in the future with full understanding that this would not be covered by insurance and would be her responsibility.  This would have to wait until after she has her right total knee replacement surgery.    PLAN:  Medically necessary sclerotherapy bleeding right anterolateral leg vein     Arian Salazar MD, FACS    Dictated using Dragon voice recognition software which may result in transcription errors        VEIN CLINIC LEG DRAWING:

## 2024-02-14 DIAGNOSIS — I83.891 VARICOSE VEINS OF RIGHT LOWER EXTREMITY WITH OTHER COMPLICATIONS: Primary | ICD-10-CM

## 2024-03-12 ENCOUNTER — OFFICE VISIT (OUTPATIENT)
Dept: VASCULAR SURGERY | Facility: CLINIC | Age: 74
End: 2024-03-12
Payer: COMMERCIAL

## 2024-03-12 ENCOUNTER — ANCILLARY PROCEDURE (OUTPATIENT)
Dept: ULTRASOUND IMAGING | Facility: CLINIC | Age: 74
End: 2024-03-12
Attending: SURGERY
Payer: COMMERCIAL

## 2024-03-12 DIAGNOSIS — I83.891 VARICOSE VEINS OF RIGHT LOWER EXTREMITY WITH OTHER COMPLICATIONS: ICD-10-CM

## 2024-03-12 DIAGNOSIS — I83.891 VARICOSE VEINS OF RIGHT LOWER EXTREMITY WITH OTHER COMPLICATIONS: Primary | ICD-10-CM

## 2024-03-12 PROCEDURE — 93971 EXTREMITY STUDY: CPT | Mod: RT | Performed by: SURGERY

## 2024-03-12 PROCEDURE — 99213 OFFICE O/P EST LOW 20 MIN: CPT | Performed by: SURGERY

## 2024-03-12 NOTE — PROGRESS NOTES
[facility] [place] Progress Note    Sharon Torrez presents in follow-up of bilateral lower extremity varicose veins with complications of hemorrhage on 2 occasions from a distal anterior leg vein.  Please see my consultation of 2024 for details.    She returned today for a right lower extremity venous competency study which we discussed.    Physical Exam  General: Pleasant female in no acute distress.  Blood pressure 119/78, pulse 76  Extremities: Right lower extremity: 3-4 mm varicose vein coursing down the right pretibial area from the proximal third of the anterolateral right leg.  There are smaller, punctate veins in the anterolateral proximal right leg with a scab overlying 1 of these veins, the area for which she has bled on 2 occasions in the last week.  No significant right lower extremity edema.  Scattered telangiectasias and reticular veins onto the dorsum of the right foot.     Ultrasound:  Name:  Sharon Torrez                                                Patient ID: [id number]  Date: 2024                                                 : 1950  Sex: female                                                                 Examined by: SARAH Cassidy RVT  Age:  73 year old                                                         Reading MD: ALVIN Salazar     INDICATION:  Varicose veins with other complications     EXAM TYPE  RIGHT LOWER EXTREMITY VENOUS DUPLEX FOR VENOUS INSUFFICIENCY  TECHNICAL SUMMARY     A duplex ultrasound study using color flow was performed, to evaluate the right lower extremity veins for valvular incompetence with the patient in a steep reversed trendelenberg.      RIGHT:     The deep veins demonstrate phasic flow, compress and respond to augmentations.  There is no DVT.  The common femoral vein is incompetent and free of thrombus. The remaining deep veins are competent and free of thrombus.      The GSV demonstrates phasic flow, compresses  and responds to augmentations from the saphenofemoral junction to the ankle with no evidence of thrombus. The great saphenous vein measures 6.1 mm at the saphenofemoral junction, 5.5 mm at the proximal thigh, and 4.8 mm at the knee. The GSV is incompetent from SFJ to proximal thigh and again at the proximal calf, with the greatest reflux time of 1100 milliseconds. The GSV gives rise to multiple incompetent varicose veins, the largest measures 3.8 mm off the Proximal Calf that courses Anterolateral with a reflux time of 821 milliseconds.     The AASV is incompetent ( 5.0 mm) draining into the saphenofemoral junction. The AASV is incompetent from the saphenofemoal junction to the proximal thigh with a reflux time of 5361  milliseconds. The AASV takes a straight course for 12 cm.  The AASV gives rise to a varicose branch measuring 7.4 mm off the Proximal Thigh  that courses Anterolateral with a reflux time of 3037 milliseconds.     The Giacomini vein is competent ( 2.0 mm) communicating with the small saphenous vein at the knee level.      The SSV demonstrates phasic flow, compresses and responds to augmentations from the popliteal space to the ankle.  No reflux or thrombus is seen. The saphenopopliteal junction is competent ( 3.2 mm). There is a duplicate SSV in the proximal calf.     Perforators: there is no evidence of incompetent  veins at any level.      Lateral thigh varicose veins are noted measuring 2.7 mm that course superiorly to a possible pelvic source.     LEFT:     The CFV demonstrate phasic flow, compress and respond to augmentations.  There is no DVT, however, left CFV demonstrated  incompetence.     FINAL SUMMARY:  Right lower extremity:  Deep veins  1.  No deep vein thrombosis  2.  Common femoral vein incompetence, otherwise deep veins are competent     Superficial veins  1.  Proximal through mid thigh and proximal calf great saphenous vein incompetence, the source of varicosities in the  thigh and calf  2.  Anterior accessory saphenous vein incompetence, the source of incompetent varicosities in the thigh extending onto the lateral leg  3.  Pelvic source varicose veins coursing down posterior lateral thigh      veins  No incompetent perforators     Left lower extremity:  Normal phasicity, compression and augmentation of the common femoral vein with no evidence of deep vein thrombosis.  The common femoral vein is incompetent      veins  No incompetent perforators     Incompetence Criteria: is greater than 500 milliseconds in superficial and  veins and greater than 1000 milliseconds in deep veins.     ALVIN Salazar MD, FACS    Assessment:  Right lower extremity varicose veins with complication of hemorrhage on 2 occasions.  We discussed her right lower extremity venous competency study utilizing a leg vein drawing.    Her right great saphenous vein is incompetent, measures 5.5 mm in diameter in the proximal thigh with reflux time of 689 ms.  It is the source of multiple, incompetent varicosities on the thigh and calf, 1 of which courses to the area of hemorrhage on the calf.  Her right anterior accessory saphenous vein is also incompetent, measures 5 mm in diameter with reflux time of 5.3 seconds and is a source of a 7.4 mm diameter incompetent varicosity with reflux time of 3 seconds coursing down the thigh and onto the leg.    The option of continued conservative management with use of compression hose, leg elevation, dietary measures and exercise was discussed.  She is very concerned about recurrent hemorrhage as am I.    She is a candidate for radiofrequency ablation of the right great saphenous and anterior sensory saphenous veins with medically necessary sclerotherapy of the varicosities coursing deep to the area of hemorrhage.  Details of procedure including risks of bleeding, infection, nerve injury, deep vein thrombosis from the ablation and allergic  reaction, deep vein thrombosis from the sclerotherapy were discussed.  She voiced understanding of our discussion and her questions were answered.    She has been receiving a series of infusions for her osteoporosis.  I do not believe that treatment of her veins would affect her infusions for osteoporosis.  I also am not aware that the infusion for osteoporosis could increase risk of venous thromboembolism with treatment of her vein problems.  I will have her discuss this with the physician in charge of these infusions.    Plan:  Radiofrequency ablation of the right great saphenous and anterior accessory saphenous vein with medically necessary, ultrasound-guided sclerotherapy    Arian Salazar MD, FACS    Dictated using Dragon voice recognition software which may result in transcription errors        March 12, 2024    Vein Procedure Recommendation    Spoke with patient in clinic.    Dr. Salazar has recommended patient to have the following vein procedure(s):     1. Right leg VNUS closure GSV and ASV and Ultrasound Guided Sclerotherapy (medically necessary), 1-2 sessions      Patient Pre-op Questions:  [facility] [address]  Anticoagulant/ASA: No  Artificial Joint or Heart Valve:  Yes - will require preop abx  Open ulcer:  No  Sedation nausea: No      Patient is recommended to wear Thigh High compression hose following her procedure. Discussed compression hose. Explained to patient if insurance doesn't cover the compression hose there are a couple different options to getting them and to call the clinic to let us know if they need help.    Handed patient written procedure instructions to review on her own (see After Visit Summary).    Next steps:    Insurance Submission  Informed patient this process could take up to 14 business days, but once approved, the patient will be contacted by our surgery scheduler to schedule the above procedure. Gave patient our surgery scheduler's  information.    After Visit Follow Up  Per Dr. Salazar, patient was recommended to have 2 - 4 sessions at $407.00 of cosmetic sclerotherapy.    Reviewed with and gave to patient our vein clinic sclerotherapy packet of information which includes basic sclerotherapy information, pre-treatment instructions and post-treatment instructions.      Patient was also recommended to start L Formula and Edemawear. These instructions were placed into her AVS after she had left the clinic and patient was called and a detailed voice message was left to review these updates.    Patient is in agreement with all of the above and has no further questions at this time.    Abiola Ivory RN  [facility]  Vein Clinic

## 2024-03-12 NOTE — PATIENT INSTRUCTIONS
Procedure Plan:   1. Right leg VNUS closure of the GSV/ASV with ultrasound guided sclerotherapy medically necessary (1-2 sessions)  2. Cosmetic sclerotherapy 2-4 sessions starting 72 hours after procedure  3. Additional session of US Guided sclerotherapy medically necessary - to be scheduled at a later date  4. Continue with cosmetic sclerotherapy as desired          Pre-Procedure Instructions:                                        VNUS Closure   You are having a VNUS Closure. One or more of your veins will be closed with radiofrequency heating.    Insurance  Precertification and/or referral authorization may be required by your insurance company.  We will call your insurance company to verify benefits for the medically necessary part of your procedure.    Your Current Medications and Allergies  Are you on blood thinner medications? (Aspirin, Plavix, Coumadin, Eliquis, Xarelto) Please discuss this with your surgeon.  Are you sensitive to latex or adhesives used for fake fingernails? Please let us know!     Driving Escort and   Please arrange to have a trusted adult (18 years old or older) drive you to and from the clinic.  For your safety, we recommend you have a trusted adult to stay with you until the next morning.    Your Health  If you have a change in your health before the procedure, contact our office immediately.  (For example: cold symptoms, cough, urinary tract infection, fever, flu symptoms.)  A pre-procedure physical is not required.    Note  It is sometimes necessary to adjust the procedure schedule due to emergencies. We greatly appreciate your flexibility and understanding in this matter.  ____________________________________________________________________________________  Check List:  The Morning of Your Procedure  ___1.  Please do not apply anything on your leg(s) or shave the day of your procedure.  ___2.  You may take your normal medications the day of your procedure.  ___3.  It is  recommended you eat a light breakfast or lunch on the day of your procedure.  ___4.  Wear comfortable loose-fitting clothing and wide-fitting shoes (i.e. tennis shoes, slip-ons).  ___5.  Please arrive at our clinic at the specified time given by the nurse.  ___6.  You will sign an affirmation of informed consent.  ___7.  Bring your pre-procedure sedation medication (lorazepam and clonidine) with you to the clinic.              One hour before your procedure, you will be instructed to take these medications. The lorazepam              (Ativan) lowers anxiety and sedates you; the clonidine makes the lorazepam more effective. Everyone's              body processes these medications differently. Therefore, reactions to these medications vary. Some              people stay awake and some people sleep through the whole procedure. You may not remember              everything about the procedure or the day. You do not want to make any big decisions for the rest of the              day.         The Day of Your Procedure:                  VNUS Closure  In the Exam Room  A nurse will bring you back to an exam room with your family member or friend. This is when your informed consent will be signed, and you will take your pre-procedure medications.  You will be asked to remove everything from the waist down, including undergarments. You will then put on a hospital gown or shorts and blue booties.  Your surgeon will come in to answer any questions and dilia the appropriate leg(s) with a marker.  You will be taken to the restroom to empty your bladder before going into the procedure room.    In the Procedure Room  You will be escorted to the procedure room. You will lie on a procedure table covered with a sheet or blanket.  A nurse will put a blood pressure cuff on your arm and a pulse/oxygen monitor on a finger. Your vital signs will be monitored every 15 minutes.  Your gown will be pulled up slightly and the groin exposed for a  short period of time. The surgeon's assistant will clean your foot, leg, and groin with an antibacterial solution. We will get you covered up as quickly as possible!  Sterile towels and blue drapes will be used to cover you and the table. You will be asked to keep your hands under the blue drapes during the procedure.    The Procedure  The surgeon will visualize your veins with an ultrasound machine. He or she will then numb your skin and access the vein. A catheter is passed up the vein and positioned with ultrasound guidance. The table will then be tipped head down.  Once the catheter is in the correct position, medication will be injected to numb your leg. You will feel some needle sticks and may feel discomfort as the medication goes in. Once this is done, you should not experience significant discomfort. But if you do, please let us know and more numbing medication can be injected. As the catheter sends out heat, the vein closes off and the catheter is withdrawn.    Post-Procedure  Once the procedure is done, your leg will be washed with warm water and dried. You will have one or more small bandages covering your incisions. Your compression hose will be put on or an ACE wrap from your toes to groin. If the ACE wrap feels too tight or binds, please elevate your leg and loosen it.  You will be offered something to drink and a light snack.  You will rest with your leg(s) elevated for approximately 30 minutes. Your friend or family member may join you.   For your safety, you will be accompanied to your car by a staff member.      Post-Procedure Instructions:                          VNUS Closure    Post-Op Day Zero - The Day of Your Procedure:  1. Medication for Pain Control and Inflammation Control   - The numbing medication injected during your procedure will last for several hours. The pre-procedure    tablets may make you very sleepy and you might not remember everything from the procedure or from    the day.  This will usually wear off by the next day.   - Ibuprofen:  If tolerated, take ibuprofen (e.g., Advil) to reduce inflammation whether or not you have    pain. For three days, take two tablets (200mg each) with every meal and at bedtime with a snack. If    you are not able to take Ibuprofen, Tylenol is another option.   - You may resume taking any medications you were taking before your procedure.  2. Activity   You may be up as tolerated when the sedation wears off. Elevate if possible when not walking.  3. Bandages   - You will have one or more small bandages covering the incision site(s) where we accessed the               vein(s). Keep your bandages on and dry for 48 hours. Compression hose should be worn continuously               over the bandages for the first 24 hours.  4. Incisions   - Bleeding: You may see some incision sites that are oozing through the bandages. This is not unusual    and can be managed with Rest, Ice, Compression and Elevation (RICE). Apply ice and firm pressure    directly to the site that is bleeding and rest with your leg(s) elevated above your heart for 20-30               minutes.    Post-Op Day One:  1. Medication   - Ibuprofen:  Continue the same as the Day of Your Procedure.  2. Activity   - Walk as tolerated. Resume your normal daily walking activities. If it hurts, stop. We encourage you to               walk. Elevate if possible when not walking.  3. Bandages and Compression   - After 24 hours, you may remove your compression hose to take a shower. Please keep your               bandage(s) on and intact. You may want to cover your bandage(s) to ensure it remains dry during your               shower. Reapply your compression hose after your shower and wear during waking hours only.  4. Driving   - You may resume driving when you can do so safely.    Post-Op Day Two:   1. Medication  - Ibuprofen:  Continue the same as the Day of Your Procedure.  2.  Activity   - Walk as  tolerated. Elevate if possible when not walking.  3. Bandages and Compression  - You may remove your bandage after 48 hours. Continue wearing your compression hose during waking hours only for a total of seven days following your procedure.  4. Incisions   - Your leg(s) may be bruised at or near incision site(s) and possibly have numb spots. This is normal.   5. Call Us If:   - You see any areas on your leg that are red and angry in appearance.   - You notice any drainage that is milky or cloudy in appearance or that has a foul odor.   - You run a temperature of 100.5 or greater.      Post-Op Day Three:  You will have a follow up appointment 2-4 days post-procedure. At this appointment, you will have an ultrasound and we will check your incisions.        The Two Weeks Following Your Procedure  1.  Skin Care              - Do not use any lotions, creams, or powders on your incision(s) for 14 days or until the incisions have               healed.              - Do not soak in a bathtub, hot tub or go swimming for 14 days or until your incisions have healed.  2.  Medications   - You may use ibuprofen or acetaminophen (e.g., Tylenol) as needed for pain or discomfort.  3.  Activity   - Do not lift over 25 pounds. After about two weeks you may resume exercise such as aerobics,               running, tennis or weightlifting. Use your common sense and ease back into your exercise routine              slowly.   - You may feel a cord-like tightness along the inside of your leg. Gentle stretching can be helpful.  4. Compression Hose   - Your doctor may instruct you to wear compression for longer than seven days; please    follow your doctor's instructions. As a comfort measure, you may choose to wear compression for    longer than required.  5.  Travel   - Do not fly in an airplane for 14 days after your procedure.  If you have a long car trip planned within    two to three weeks following your procedure, stop and walk for a  few minutes every two hours.    Periodic ankle pumps during the ride may be helpful.    Six Week Appointment  - At your six-week appointment, you will see your surgeon for an exam and evaluation. This office visit               will be scheduled when you return for post-op day three return appointment.     Return to Work  1.  If you work outside the home, you may return to work in a few days depending on the extent of your    procedure, how you tolerate it, and the type of work you perform.  2.  Paperwork: If your employer requires paperwork or you would like a letter written to your employer, please    let us know. We will complete disability type forms at no charge. Please allow five business days for    forms to be completed.         Sclerotherapy: Pre-Treatment Instructions    Recommended Sessions:  ___2___ treatment sessions medically necessary (billed through insurance)      2-4 treatment sessions cosmetic, see below pricing  Pricing: Full session - $407                 *Payment is due at the time of visit following the treatment    Time Required per Treatment Session - About 45 minutes  Please come in 15 minutes before your scheduled appointment.  30 min.  Sclerotherapy treatments last approximately 30 minutes.  5 min.    A staff member will wipe your legs off with warm water and dry them with a wash cloth.                 Then you can put your compression hose on, get dressed and check out.  10 min.  After your treatment, you will be asked to walk around for 10 minutes before you get in your car.    Medications  Five days before your appointment, discontinue aspirin (Bufferin, Anacin, etc.) and Ibuprofen (Motrin, Advil, Aleve, etc.) to reduce bruising. Resume these medications the day following the treatment.    Leg Preparation  Do not shave your legs or apply any oil, lotion or powder the night before or the day of your treatment.    Clothing  Shorts:  Bring a pair of loose, comfortable shorts to wear  during your treatment (or you can choose to wear ours). Shoes: Bring comfortable shoes to accommodate the compression hose after your treatment. Do not wear flip-flops or thong-style sandals unless you have open-toe compression hose.    Photographs  Photos will be taken before each treatment. This helps monitor your progress.    Injections  The physician will inject your veins with the sclerotherapy solution chosen to meet your specific needs.    Compression Hose  Please bring your compression hose if you have them. They may also be reserved for you at our clinic. Compression hose must be worn immediately after each sclerotherapy treatment.  The hose must be compression level 20-30, and they must be worn for 24 hours straight after your treatment.  If you have never worn compression hose before, a staff member will teach you how to put them on.             You cannot have a treatment without compression hose.               They are critical to the success of your treatment!    You may purchase your compression hose from us. We will measure you and have the hose available when you come for your treatment.    Cancellation and Rescheduling  If you need to cancel or reschedule your sclerotherapy treatment, please give our office at least 24 hour notice.    Sclerotherapy: Basic Information    What is sclerotherapy?  Sclerotherapy is a treatment for  spider  veins.  Spider  veins are small veins just under your skin that can look red, blue or purple. Most  spider  veins are only a cosmetic problem.  Spider  veins are not useful and treating them will not affect your circulation.    How does sclerotherapy work?  1.  Injections: A very small needle is used to inject a solution into the  spider  veins. The solution irritates the cells that line the vein walls. This causes the veins to collapse. The vein walls to stick together and they can no longer carry blood. Different solutions are used based on the size of the  veins.  2.  Compression:  The spider veins are kept collapsed by wearing compression stockings. Your body will break down and absorb the treated veins. You wear the compression hose for 24 hours after the treatment and then for 4 more days during your waking hours only.    How does the body heal after sclerotherapy?  The process is similar to how your body heals after a bad bruise. It takes 4-6 weeks or more for the healing to be complete. When the healing is complete, the vein is no longer visible. It may take more than one treatment.    How do I get the best results?  It is important to follow the post-sclerotherapy instructions. The best results require time and patience. The injection sites will continue to heal and fade for months after a treatment. Please discuss your expectations with your doctor to keep them realistic. Your doctor will do everything possible to meet or exceed your expectations.    How many treatments are needed?  After your initial exam, your doctor will give you an estimate of the number of treatments that may be required. It depends upon the size, type, and quantity of your  spider  veins and on the doctor's assessment, your history and expectations. You may end up needing fewer or more treatments.    How soon can I have another treatment?  Additional treatments are scheduled every 4-6 weeks to allow time for the body to respond to the previous treatment.    Common Side Effects:  Itching  The areas that were injected may itch. This is usually mild and lasts less than a day. Do not use lotions or creams on your legs until the injection sites have healed over.    Pain  It is common to have some tenderness at the injection sites. Injection of the solution can be uncomfortable, but is usually well tolerated by most patients. The tenderness is temporary, lasting 24 hours at most. Tylenol or Ibuprofen can be used, if needed, following the product directions.    Bruising  This may occur at the  injections sites. Bruising may be minimized by avoiding Aspirin and Ibuprofen products for five days before each treatment session.    Hyperpigmentation  A light brown discoloration of the skin may develop along the veins in the areas injected. Approximately 20-30% of patients treated note the discoloration (which is lighter and less obvious than the veins that are being treated). The hyperpigmentation usually fades in a couple of weeks, but may take several months to a year to totally resolve. There is a 1% chance of hyperpigmentation continuing after one year.    Trapped blood  A small amount of blood may become trapped and hardened in the veins. This may feel like a knot or cord and it may look dark blue or bruised. This is a common occurrence. You may need to return before your next treatment so this area can be drained to remove the trapped blood. This will reduce the hyperpigmentation that can occur. The chance of this occurring can be decreased with proper use of compression hose after your treatment.    Matting  Matting is the formation of new, fine  spider  veins in the area injected.  It occurs in approximately 10% of patients injected. The exact reason for this is unknown. If untreated, the matting usually resolves in 3 to 12 months, but very rarely, it can be permanent. If the matting does not fade, it can be re-injected.    Rare Side Effects:  Ulceration at injection sites  Very rarely, a small ulcer will occur at the site where a vein is injected. An ulcer can take 4 to 6 weeks to completely heal. A small scar may result.    Allergic reactions  There is a very rare incidence of an allergic reaction to the solution injected. You will be observed for such reaction and will be treated appropriately should it occur. Please inform us of any allergy history.    Pulmonary embolus/Deep vein thrombosis  This is a blood clot which moves to the lungs/a blood clot in the deep vein system. There is an  extraordinarily low incidence of this complication.      SCLEROTHERAPY AFTER CARE  Immediately:  After treatment, walk for 10-15 minutes before getting in your car.  If your trip home is more than 1 hour, stop and walk around for 5-10 minutes. Avoid sitting or standing for extended periods.   First 24 hours: Wear your compression continuously, even while in bed. After the 24 hours, you may shower if you want to. Put your hose back on, unless you are going to bed. You should NOT wear compression to bed after the first 24 hours. You may fly the next day, but wear your compression.   For 5 days: Wear the compression hose for waking hours only. You may continue to wear them longer than 5 days if you prefer.   For days 5-7: Walking is encouraged, as it promotes efficient circulation in your veins. You may do activities that raise your heart rate, but do NOT run, jog, do high impact aerobics, or weight lifting. After 7 days, no activity restrictions.  Shaving: Wait a few days to shave or apply lotion.   Bathing: Do NOT take hot baths or sit in a hot tub for 7-10 days.    For 1 year: Wear SPF 30 sunscreen on your legs when in the sun. This is very important! It helps prevent darkening of the skin at the injection sites.   Medications: You may resume your usual medications, including aspirin or ibuprofen.    Common Things to Expect       Compression must be worn for the first 24 hours and then during the day for 5-7 days.    If larger veins are treated with ultrasound-guided sclerotherapy, you will have redness, firmness, tenderness, and swelling.  This firmness and tenderness may take 3-6 months to resolve. Ibuprofen and compression hose will aid in this process.    You will have bruising that can last up to 3 weeks. Most fading of the veins will occur between 3 and 6 weeks after treatment.    You may notice brown discoloration (hyperpigmentation) at the treatment site.  This should fade with time, but will take 3 months  to 1 year to fully heal.     Some treated veins may look darker because of trapped blood within the vein. This trapped blood can be removed at a minimum of 1 month following treatment. Larger veins are more likely to develop trapped blood.    It is very important for you to use at least SPF 30 sunscreen in order to help prevent the discoloration of your skin.    Migraines rarely occur following sclerotherapy, but are more likely in patients with a history of migraines.  Treat as you would any other migraine.     Lymphatic  L  Formula:   Active wound-Take (1) 1000 mg capsule twice daily.   Closed wounds/No wound-Take (1) 1000 mg capsule once per day  *Order from [employer] or [employer] ([phone])*         You have been recommended to wear Edemawear.    How to Apply EdemaWear   - Apply EdemaWear starting from toes pulling up to knees with a cuff at the top of the stockings.  - DO NOT CUT OR TRIM STOCKINGS, fold over the stocking until the top of it lays just BELOW your knee crease  - If wearing socks, wear them over top of EdemaWear. EdemaWear should be in direct contact with the skin.  - Apply a fresh pair daily    Care of EdemaWear  - DO NOT THROW AWAY THE OLD PAIR OF EDEMAWEAR, WASH IT.       o  Hand or machine wash in cold water and hang dry    EdemaWear Sizes  Size  Calf circumference  Stripe color   Small  up to 45cm  Navy__your size!   Medium up to 75cm  Yellow______   Large  up to 115cm  Red________   X-Large up to 150cm  Aqua_______    Options for purchasing  - [employer] [facility] ([facility])    [address]    [place] [address]    Ph: [phone]    - [employer] ([facility])    Ph: [phone]  Email: [email or web address]     Visit [email or web address] for more information

## 2024-03-12 NOTE — LETTER
3/12/2024         RE: Sharon Torrez  [address] [facility] [address]        Dear Colleague,    Thank you for referring your patient, Sharon Torrez, to the [facility] [place]. Please see a copy of my visit note below.    [facility] [place] Progress Note    Sharon Torrez presents in follow-up of bilateral lower extremity varicose veins with complications of hemorrhage on 2 occasions from a distal anterior leg vein.  Please see my consultation of 2024 for details.    She returned today for a right lower extremity venous competency study which we discussed.    Physical Exam  General: Pleasant female in no acute distress.  Blood pressure 119/78, pulse 76  Extremities: Right lower extremity: 3-4 mm varicose vein coursing down the right pretibial area from the proximal third of the anterolateral right leg.  There are smaller, punctate veins in the anterolateral proximal right leg with a scab overlying 1 of these veins, the area for which she has bled on 2 occasions in the last week.  No significant right lower extremity edema.  Scattered telangiectasias and reticular veins onto the dorsum of the right foot.     Ultrasound:  Name:  Sharon Torrez                                                Patient ID: [id number]  Date: 2024                                                 : 1950  Sex: female                                                                 Examined by: SARAH Cassidy RVT  Age:  73 year old                                                         Reading MD: ALVIN Salazar     INDICATION:  Varicose veins with other complications     EXAM TYPE  RIGHT LOWER EXTREMITY VENOUS DUPLEX FOR VENOUS INSUFFICIENCY  TECHNICAL SUMMARY     A duplex ultrasound study using color flow was performed, to evaluate the right lower extremity veins for valvular incompetence with the patient in a steep reversed trendelenberg.      RIGHT:     The deep veins  demonstrate phasic flow, compress and respond to augmentations.  There is no DVT.  The common femoral vein is incompetent and free of thrombus. The remaining deep veins are competent and free of thrombus.      The GSV demonstrates phasic flow, compresses and responds to augmentations from the saphenofemoral junction to the ankle with no evidence of thrombus. The great saphenous vein measures 6.1 mm at the saphenofemoral junction, 5.5 mm at the proximal thigh, and 4.8 mm at the knee. The GSV is incompetent from SFJ to proximal thigh and again at the proximal calf, with the greatest reflux time of 1100 milliseconds. The GSV gives rise to multiple incompetent varicose veins, the largest measures 3.8 mm off the Proximal Calf that courses Anterolateral with a reflux time of 821 milliseconds.     The AASV is incompetent ( 5.0 mm) draining into the saphenofemoral junction. The AASV is incompetent from the saphenofemoal junction to the proximal thigh with a reflux time of 5361  milliseconds. The AASV takes a straight course for 12 cm.  The AASV gives rise to a varicose branch measuring 7.4 mm off the Proximal Thigh  that courses Anterolateral with a reflux time of 3037 milliseconds.     The Giacomini vein is competent ( 2.0 mm) communicating with the small saphenous vein at the knee level.      The SSV demonstrates phasic flow, compresses and responds to augmentations from the popliteal space to the ankle.  No reflux or thrombus is seen. The saphenopopliteal junction is competent ( 3.2 mm). There is a duplicate SSV in the proximal calf.     Perforators: there is no evidence of incompetent  veins at any level.      Lateral thigh varicose veins are noted measuring 2.7 mm that course superiorly to a possible pelvic source.     LEFT:     The CFV demonstrate phasic flow, compress and respond to augmentations.  There is no DVT, however, left CFV demonstrated  incompetence.     FINAL SUMMARY:  Right lower  extremity:  Deep veins  1.  No deep vein thrombosis  2.  Common femoral vein incompetence, otherwise deep veins are competent     Superficial veins  1.  Proximal through mid thigh and proximal calf great saphenous vein incompetence, the source of varicosities in the thigh and calf  2.  Anterior accessory saphenous vein incompetence, the source of incompetent varicosities in the thigh extending onto the lateral leg  3.  Pelvic source varicose veins coursing down posterior lateral thigh      veins  No incompetent perforators     Left lower extremity:  Normal phasicity, compression and augmentation of the common femoral vein with no evidence of deep vein thrombosis.  The common femoral vein is incompetent      veins  No incompetent perforators     Incompetence Criteria: is greater than 500 milliseconds in superficial and  veins and greater than 1000 milliseconds in deep veins.     ALVIN Salazar MD, FACS    Assessment:  Right lower extremity varicose veins with complication of hemorrhage on 2 occasions.  We discussed her right lower extremity venous competency study utilizing a leg vein drawing.    Her right great saphenous vein is incompetent, measures 5.5 mm in diameter in the proximal thigh with reflux time of 689 ms.  It is the source of multiple, incompetent varicosities on the thigh and calf, 1 of which courses to the area of hemorrhage on the calf.  Her right anterior accessory saphenous vein is also incompetent, measures 5 mm in diameter with reflux time of 5.3 seconds and is a source of a 7.4 mm diameter incompetent varicosity with reflux time of 3 seconds coursing down the thigh and onto the leg.    The option of continued conservative management with use of compression hose, leg elevation, dietary measures and exercise was discussed.  She is very concerned about recurrent hemorrhage as am I.    She is a candidate for radiofrequency ablation of the right great saphenous and  anterior sensory saphenous veins with medically necessary sclerotherapy of the varicosities coursing deep to the area of hemorrhage.  Details of procedure including risks of bleeding, infection, nerve injury, deep vein thrombosis from the ablation and allergic reaction, deep vein thrombosis from the sclerotherapy were discussed.  She voiced understanding of our discussion and her questions were answered.    She has been receiving a series of infusions for her osteoporosis.  I do not believe that treatment of her veins would affect her infusions for osteoporosis.  I also am not aware that the infusion for osteoporosis could increase risk of venous thromboembolism with treatment of her vein problems.  I will have her discuss this with the physician in charge of these infusions.    Plan:  Radiofrequency ablation of the right great saphenous and anterior accessory saphenous vein with medically necessary, ultrasound-guided sclerotherapy    Arian Salazar MD, FACS    Dictated using Dragon voice recognition software which may result in transcription errors        March 12, 2024    Vein Procedure Recommendation    Spoke with patient in clinic.    Dr. Salazar has recommended patient to have the following vein procedure(s):     1. Right leg VNUS closure GSV and ASV and Ultrasound Guided Sclerotherapy (medically necessary), 1-2 sessions      Patient Pre-op Questions:  Preferred Pharmacy: [facility] in [place] on [address]  Anticoagulant/ASA: No  Artificial Joint or Heart Valve:  Yes - will require preop abx  Open ulcer:  No  Sedation nausea: No      Patient is recommended to wear Thigh High compression hose following her procedure. Discussed compression hose. Explained to patient if insurance doesn't cover the compression hose there are a couple different options to getting them and to call the clinic to let us know if they need help.    Handed patient written procedure instructions to review on her own (see After Visit  Summary).    Next steps:    Insurance Submission  Informed patient this process could take up to 14 business days, but once approved, the patient will be contacted by our surgery scheduler to schedule the above procedure. Gave patient our surgery scheduler's information.    After Visit Follow Up  Per Dr. Salazar, patient was recommended to have 2 - 4 sessions at $407.00 of cosmetic sclerotherapy.    Reviewed with and gave to patient our vein clinic sclerotherapy packet of information which includes basic sclerotherapy information, pre-treatment instructions and post-treatment instructions.      Patient was also recommended to start L Formula and Edemawear. These instructions were placed into her AVS after she had left the clinic and patient was called and a detailed voice message was left to review these updates.    Patient is in agreement with all of the above and has no further questions at this time.    Abiola Ivory RN  [facility]      Again, thank you for allowing me to participate in the care of your patient.        Sincerely,        Arian Salazar MD

## 2024-03-27 ENCOUNTER — DOCUMENTATION ONLY (OUTPATIENT)
Dept: VASCULAR SURGERY | Facility: CLINIC | Age: 74
End: 2024-03-27
Payer: COMMERCIAL

## 2024-03-27 NOTE — PROGRESS NOTES
APPEAL for insurance denial    To whom it may concern:    Sharon Torrez is one of your insurance.  She has had multiple episodes of hemorrhage from a vein on the mid pretibial area for which the appropriate treatment is medically necessary sclerotherapy.  At your behest, I obtained a right lower extremity venous competency study which reveals incompetence of her great saphenous vein which measures 5.5 mm in diameter in the proximal thigh with reflux time of 689 ms and is the source of incompetent varicosities in the calf measuring up to 3.8 mm in diameter with reflux time of 821 ms.  These varicosities coursed to the area of hemorrhage on the anterior aspect of her right leg.    Additionally, her right anterior accessory saphenous vein is also incompetent, measures 5 mm in diameter approximately with reflux time of 5.3 seconds.  It is the source of a 7.4 mm diameter incompetent varicosity coursing down the thigh and to the area of her hemorrhage.    The veins that I wish to treat with ultrasound-guided, medically necessary sclerotherapy measure 3.8 mm in diameter on the calf and 7.4 mm in diameter in the thigh with reflux times as reported above.    I hope that this gives the information that you need to approve appropriate treatment for this patient.    Sincerely,    ALVIN Salazar MD, FACS

## 2024-04-10 DIAGNOSIS — I83.891 VARICOSE VEINS OF RIGHT LOWER EXTREMITY WITH OTHER COMPLICATIONS: Primary | ICD-10-CM

## 2024-05-02 NOTE — PROGRESS NOTES
Insurance denied the u/s med nec portion of the procedure due to the GSV needs to be treated first. Per Dr. Salazar if the patient would like to proceed with the u/s sclero  it would be an out of pocket cost of full session sclero($407). Or the patient can proceed with the VNUS closure and reassess the phlebs at the 6 wk post op.    Spoke with patient, who stated she is going to have a knee surgery and would like to schedule vein procedure after her knee surgery. She is hopeful this could be scheduled in the fall 2024. Pt will decide at that time, how she would like to proceed with the sclero. Pt will call back to schedule.    Abiola Peñaloza, Surgery Scheduler  [facility]

## 2024-10-13 ENCOUNTER — HEALTH MAINTENANCE LETTER (OUTPATIENT)
Age: 74
End: 2024-10-13

## (undated) RX ORDER — FENTANYL CITRATE 50 UG/ML
INJECTION, SOLUTION INTRAMUSCULAR; INTRAVENOUS
Status: DISPENSED
Start: 2021-08-18

## (undated) RX ORDER — LIDOCAINE HYDROCHLORIDE 10 MG/ML
INJECTION, SOLUTION EPIDURAL; INFILTRATION; INTRACAUDAL; PERINEURAL
Status: DISPENSED
Start: 2017-05-04

## (undated) RX ORDER — FENTANYL CITRATE 50 UG/ML
INJECTION, SOLUTION INTRAMUSCULAR; INTRAVENOUS
Status: DISPENSED
Start: 2018-05-09